# Patient Record
Sex: FEMALE | Race: WHITE | NOT HISPANIC OR LATINO | Employment: UNEMPLOYED | URBAN - METROPOLITAN AREA
[De-identification: names, ages, dates, MRNs, and addresses within clinical notes are randomized per-mention and may not be internally consistent; named-entity substitution may affect disease eponyms.]

---

## 2018-03-19 ENCOUNTER — HOSPITAL ENCOUNTER (EMERGENCY)
Facility: HOSPITAL | Age: 48
Discharge: HOME | End: 2018-03-19
Attending: EMERGENCY MEDICINE

## 2018-03-19 VITALS
BODY MASS INDEX: 34.36 KG/M2 | OXYGEN SATURATION: 100 % | TEMPERATURE: 99 F | HEIGHT: 61 IN | HEART RATE: 76 BPM | RESPIRATION RATE: 16 BRPM | WEIGHT: 182 LBS | SYSTOLIC BLOOD PRESSURE: 133 MMHG | DIASTOLIC BLOOD PRESSURE: 75 MMHG

## 2018-03-19 DIAGNOSIS — K43.9 VENTRAL HERNIA WITHOUT OBSTRUCTION OR GANGRENE: Primary | ICD-10-CM

## 2018-03-19 DIAGNOSIS — M54.12 CERVICAL RADICULOPATHY: ICD-10-CM

## 2018-03-19 PROCEDURE — 99283 EMERGENCY DEPT VISIT LOW MDM: CPT

## 2018-03-19 RX ORDER — PHENTERMINE HYDROCHLORIDE 37.5 MG/1
18.75 CAPSULE ORAL EVERY MORNING
Status: ON HOLD | COMMUNITY
End: 2019-02-07

## 2018-03-19 NOTE — ED PROVIDER NOTES
"Encounter Date: 3/19/2018    SCRIBE #1 NOTE: I, Yukiiman Soliman , am scribing for, and in the presence of, Dr. Hope .       History     Chief Complaint   Patient presents with    Hernia     abdomen bulging out with cough after 40lb wt loss in 3 months.     The history is provided by the patient.            2018 3:23 PM     Chief complaint: Angelica Powers is a 48 y.o. female  who presents to the ED with a hernia via with an onset x 3 days PTA. Patient also complains of numbness and neck pain x 4 months. The patient states that she was bed ridden this weekend due to a stomach virus. She states that during this time she was coughing and noticed a "bulge pop out " of her stomach when she coughed. The patient relays that she only feels a minimal amount of  pain in her abdomen when the hernia pops out. Patient reports that she a PSHx of an appendectomy, , hysterectomy, and a cyst removal. Patient also reports that she has been experiencing numbness in her right arm. She claims that this has been going on for the past four months and has become more frequent recently. The patient states that her arm usually becomes numb when she is sitting down, driving or brushing her hair. She claims that she has had two previous back surgeries. She also admits to neck pain. Patient denies chest pain, back pain, tingling , diaphoresis, palpitations, edema, and n/v/d.     Review of patient's allergies indicates:   Allergen Reactions    Phenergan [promethazine] Hives     Past Medical History:   Diagnosis Date    Bipolar affective disorder     Encounter for blood transfusion     Scoliosis     Scoliosis      Past Surgical History:   Procedure Laterality Date    ABDOMINAL SURGERY      ANKLE FRACTURE SURGERY      APPENDECTOMY      BACK SURGERY      HYSTERECTOMY       History reviewed. No pertinent family history.  Social History   Substance Use Topics    Smoking status: Never Smoker    Smokeless " tobacco: Not on file    Alcohol use Yes      Comment: rarely     Review of Systems   Constitutional: Negative for activity change, appetite change, chills, fatigue and fever.   Eyes: Negative for visual disturbance.   Respiratory: Negative for apnea and shortness of breath.    Cardiovascular: Negative for chest pain and palpitations.   Gastrointestinal: Positive for abdominal pain. Negative for abdominal distention.   Genitourinary: Negative for difficulty urinating.   Musculoskeletal: Positive for neck pain.   Skin: Negative for pallor and rash.   Neurological: Positive for numbness. Negative for headaches.   Hematological: Does not bruise/bleed easily.   Psychiatric/Behavioral: Negative for agitation.       Physical Exam     Initial Vitals [03/19/18 1431]   BP Pulse Resp Temp SpO2   (!) 140/103 82 14 98.6 °F (37 °C) 99 %      MAP       115.33         Physical Exam    Nursing note and vitals reviewed.  Constitutional: She appears well-developed and well-nourished. She is not diaphoretic. No distress.   HENT:   Head: Normocephalic and atraumatic.   Right Ear: External ear normal.   Left Ear: External ear normal.   Nose: Nose normal.   Mouth/Throat: Oropharynx is clear and moist.   Eyes: Conjunctivae and EOM are normal. Pupils are equal, round, and reactive to light.   Neck: Normal range of motion. Neck supple.   Cardiovascular: Normal rate, regular rhythm, normal heart sounds and intact distal pulses. Exam reveals no gallop and no friction rub.    No murmur heard.  Pulmonary/Chest: Breath sounds normal. No respiratory distress. She has no wheezes. She has no rhonchi. She has no rales.   Abdominal:   Reducible ventral hernia discoidal to the umbilicus   Musculoskeletal: Normal range of motion. She exhibits no edema or tenderness.   Lymphadenopathy:     She has no cervical adenopathy.   Neurological: She is alert and oriented to person, place, and time. She has normal strength. No cranial nerve deficit or sensory  deficit.   Skin: Skin is warm and dry. No rash and no abscess noted. No erythema.   Psychiatric: She has a normal mood and affect.         ED Course   Procedures  Labs Reviewed - No data to display          Medical Decision Making:   History:   Old Medical Records: I decided to obtain old medical records.  ED Management:  Alexsander Powers is a 48 y.o. female who presents with  an asymptomatic reducible ventral hernia which requires no immediate intervention.  She also described radicular symptoms in the upper extremity which are currently absent.        Imaging Results    None         APC / Resident Notes:   I, Dr. Niranjan Hope III, personally performed the services described in this documentation. All medical record entries made by the scribe were at my direction and in my presence.  I have reviewed the chart and agree that the record reflects my personal performance and is accurate and complete. Niranjan Hope III, MD.  5:25 PM 03/19/2018       Scribe Attestation:   Scribe #1: I performed the above scribed service and the documentation accurately describes the services I performed. I attest to the accuracy of the note.               Clinical Impression:   The primary encounter diagnosis was Ventral hernia without obstruction or gangrene. A diagnosis of Cervical radiculopathy was also pertinent to this visit.    Disposition:   Disposition: Discharged  Condition: Stable                        Niranjan Hope III, MD  03/19/18 1999

## 2018-05-09 ENCOUNTER — HOSPITAL ENCOUNTER (EMERGENCY)
Facility: HOSPITAL | Age: 48
Discharge: HOME | End: 2018-05-09
Attending: EMERGENCY MEDICINE

## 2018-05-09 VITALS
TEMPERATURE: 98 F | WEIGHT: 168 LBS | SYSTOLIC BLOOD PRESSURE: 161 MMHG | BODY MASS INDEX: 31.72 KG/M2 | DIASTOLIC BLOOD PRESSURE: 72 MMHG | HEIGHT: 61 IN | RESPIRATION RATE: 18 BRPM | OXYGEN SATURATION: 98 % | HEART RATE: 82 BPM

## 2018-05-09 DIAGNOSIS — R10.33 PERIUMBILICAL ABDOMINAL PAIN: Primary | ICD-10-CM

## 2018-05-09 DIAGNOSIS — K43.9 VENTRAL HERNIA WITHOUT OBSTRUCTION OR GANGRENE: ICD-10-CM

## 2018-05-09 LAB
ALBUMIN SERPL BCP-MCNC: 4.6 G/DL
ALP SERPL-CCNC: 82 U/L
ALT SERPL W/O P-5'-P-CCNC: 40 U/L
ANION GAP SERPL CALC-SCNC: 11 MMOL/L
AST SERPL-CCNC: 29 U/L
BASOPHILS NFR BLD: 2 %
BILIRUB SERPL-MCNC: 0.9 MG/DL
BILIRUB UR QL STRIP: ABNORMAL
BUN SERPL-MCNC: 11 MG/DL
CALCIUM SERPL-MCNC: 10.1 MG/DL
CHLORIDE SERPL-SCNC: 106 MMOL/L
CLARITY UR: CLEAR
CO2 SERPL-SCNC: 22 MMOL/L
COLOR UR: YELLOW
CREAT SERPL-MCNC: 0.8 MG/DL
DIFFERENTIAL METHOD: ABNORMAL
EOSINOPHIL NFR BLD: 0 %
ERYTHROCYTE [DISTWIDTH] IN BLOOD BY AUTOMATED COUNT: 16.4 %
EST. GFR  (AFRICAN AMERICAN): >60 ML/MIN/1.73 M^2
EST. GFR  (NON AFRICAN AMERICAN): >60 ML/MIN/1.73 M^2
GLUCOSE SERPL-MCNC: 81 MG/DL
GLUCOSE UR QL STRIP: NEGATIVE
HCT VFR BLD AUTO: 36.8 %
HGB BLD-MCNC: 11.3 G/DL
HGB UR QL STRIP: NEGATIVE
HYPOCHROMIA BLD QL SMEAR: ABNORMAL
KETONES UR QL STRIP: NEGATIVE
LACTATE SERPL-SCNC: 0.8 MMOL/L
LEUKOCYTE ESTERASE UR QL STRIP: NEGATIVE
LIPASE SERPL-CCNC: 13 U/L
LYMPHOCYTES NFR BLD: 22 %
MCH RBC QN AUTO: 18.8 PG
MCHC RBC AUTO-ENTMCNC: 30.7 G/DL
MCV RBC AUTO: 61 FL
MONOCYTES NFR BLD: 3 %
NEUTROPHILS NFR BLD: 73 %
NITRITE UR QL STRIP: NEGATIVE
PH UR STRIP: 6 [PH] (ref 5–8)
PLATELET # BLD AUTO: 331 K/UL
PLATELET BLD QL SMEAR: ABNORMAL
PMV BLD AUTO: 11.9 FL
POTASSIUM SERPL-SCNC: 3.2 MMOL/L
PROT SERPL-MCNC: 8.2 G/DL
PROT UR QL STRIP: NEGATIVE
RBC # BLD AUTO: 6.02 M/UL
SODIUM SERPL-SCNC: 139 MMOL/L
SP GR UR STRIP: >=1.03 (ref 1–1.03)
URN SPEC COLLECT METH UR: ABNORMAL
UROBILINOGEN UR STRIP-ACNC: 1 EU/DL
WBC # BLD AUTO: 12.1 K/UL

## 2018-05-09 PROCEDURE — 36415 COLL VENOUS BLD VENIPUNCTURE: CPT

## 2018-05-09 PROCEDURE — 99284 EMERGENCY DEPT VISIT MOD MDM: CPT | Mod: 25

## 2018-05-09 PROCEDURE — 85007 BL SMEAR W/DIFF WBC COUNT: CPT

## 2018-05-09 PROCEDURE — 85027 COMPLETE CBC AUTOMATED: CPT

## 2018-05-09 PROCEDURE — 25500020 PHARM REV CODE 255

## 2018-05-09 PROCEDURE — 96374 THER/PROPH/DIAG INJ IV PUSH: CPT

## 2018-05-09 PROCEDURE — 25000003 PHARM REV CODE 250: Performed by: EMERGENCY MEDICINE

## 2018-05-09 PROCEDURE — 81003 URINALYSIS AUTO W/O SCOPE: CPT

## 2018-05-09 PROCEDURE — 96361 HYDRATE IV INFUSION ADD-ON: CPT

## 2018-05-09 PROCEDURE — 83605 ASSAY OF LACTIC ACID: CPT

## 2018-05-09 PROCEDURE — 63600175 PHARM REV CODE 636 W HCPCS: Performed by: EMERGENCY MEDICINE

## 2018-05-09 PROCEDURE — 83690 ASSAY OF LIPASE: CPT

## 2018-05-09 PROCEDURE — 80053 COMPREHEN METABOLIC PANEL: CPT

## 2018-05-09 RX ORDER — ONDANSETRON 4 MG/1
4 TABLET, FILM COATED ORAL EVERY 6 HOURS
Qty: 12 TABLET | Refills: 0 | Status: ON HOLD | OUTPATIENT
Start: 2018-05-09 | End: 2019-02-08 | Stop reason: SDUPTHER

## 2018-05-09 RX ORDER — ONDANSETRON 4 MG/1
4 TABLET, ORALLY DISINTEGRATING ORAL
Status: COMPLETED | OUTPATIENT
Start: 2018-05-09 | End: 2018-05-09

## 2018-05-09 RX ORDER — HYDROMORPHONE HYDROCHLORIDE 2 MG/ML
1 INJECTION, SOLUTION INTRAMUSCULAR; INTRAVENOUS; SUBCUTANEOUS
Status: COMPLETED | OUTPATIENT
Start: 2018-05-09 | End: 2018-05-09

## 2018-05-09 RX ORDER — SODIUM CHLORIDE 9 MG/ML
INJECTION, SOLUTION INTRAVENOUS
Status: DISCONTINUED
Start: 2018-05-09 | End: 2018-05-10 | Stop reason: HOSPADM

## 2018-05-09 RX ORDER — CYCLOBENZAPRINE HCL 10 MG
10 TABLET ORAL 3 TIMES DAILY PRN
Qty: 15 TABLET | Refills: 0 | Status: SHIPPED | OUTPATIENT
Start: 2018-05-09 | End: 2018-05-14

## 2018-05-09 RX ORDER — NAPROXEN 500 MG/1
500 TABLET ORAL 2 TIMES DAILY WITH MEALS
Qty: 30 TABLET | Refills: 0 | Status: SHIPPED | OUTPATIENT
Start: 2018-05-09 | End: 2018-05-24

## 2018-05-09 RX ADMIN — IOHEXOL 75 ML: 350 INJECTION, SOLUTION INTRAVENOUS at 09:05

## 2018-05-09 RX ADMIN — HYDROMORPHONE HYDROCHLORIDE 1 MG: 2 INJECTION INTRAMUSCULAR; INTRAVENOUS; SUBCUTANEOUS at 07:05

## 2018-05-09 RX ADMIN — ONDANSETRON 4 MG: 4 TABLET, ORALLY DISINTEGRATING ORAL at 07:05

## 2018-05-09 RX ADMIN — IOHEXOL 15 ML: 350 INJECTION, SOLUTION INTRAVENOUS at 09:05

## 2018-05-09 RX ADMIN — ONDANSETRON 4 MG: 4 TABLET, ORALLY DISINTEGRATING ORAL at 09:05

## 2018-05-09 RX ADMIN — SODIUM CHLORIDE 1000 ML: 0.9 INJECTION, SOLUTION INTRAVENOUS at 07:05

## 2018-05-10 NOTE — ED PROVIDER NOTES
Encounter Date: 5/9/2018    SCRIBE #1 NOTE: I, Zaheer Dillon, am scribing for, and in the presence of, Dr. Hamilton.       History     Chief Complaint   Patient presents with    Abdominal Pain     c/o lower abdominal pain x 3 days with nausea-hx of hernia       05/09/2018 7:06 PM     Chief complaint: Abdominal Pain      Alexsander Powers is a 48 y.o. female with a past medical history of scoliosis, abdominal hernia, and Bipolar affective disorder presenting to the ED with a gradual onset of sudden abdominal pain beginning 3 days ago. The pt reported she has a known abdominal hernia for 2 months with no initial pain. She noted the abdominal pain has been progressively worsened for 3 days when she noticed her abdomen was bulging and pushed her intestines back in. The pt described the pain as a constant dull pain. She stated the abdominal pain is exacerbated with abdominal contraction. The pt endorsed she has not had a bowel movement in 2 days. Associated symptoms of nausea and abdominal distension. The pt denied constipation and vomiting. She denied alleviation of pain with aleve. The pt has no history of cigarette smoking. She has a past surgical history of back surgery and abdominal surgery.       The history is provided by the patient.     Review of patient's allergies indicates:   Allergen Reactions    Phenergan [promethazine] Hives     Past Medical History:   Diagnosis Date    Bipolar affective disorder     Encounter for blood transfusion     Scoliosis     Scoliosis      Past Surgical History:   Procedure Laterality Date    ABDOMINAL SURGERY      ANKLE FRACTURE SURGERY      APPENDECTOMY      BACK SURGERY      HYSTERECTOMY       History reviewed. No pertinent family history.  Social History   Substance Use Topics    Smoking status: Never Smoker    Smokeless tobacco: Not on file    Alcohol use Yes      Comment: rarely     Review of Systems   Constitutional: Negative for fever.   HENT: Negative for  congestion.    Eyes: Negative for visual disturbance.   Respiratory: Negative for wheezing.    Cardiovascular: Negative for chest pain.   Gastrointestinal: Positive for abdominal distention, abdominal pain and nausea. Negative for constipation and vomiting.   Genitourinary: Negative for dysuria.   Musculoskeletal: Negative for joint swelling.   Skin: Negative for rash.   Neurological: Negative for syncope.   Hematological: Does not bruise/bleed easily.   Psychiatric/Behavioral: Negative for confusion.       Physical Exam     Initial Vitals [05/09/18 1855]   BP Pulse Resp Temp SpO2   (!) 175/96 79 18 98.2 °F (36.8 °C) 100 %      MAP       122.33         Physical Exam    Nursing note and vitals reviewed.  Constitutional: She appears well-nourished.   HENT:   Head: Normocephalic and atraumatic.   Eyes: Conjunctivae and EOM are normal.   Neck: Normal range of motion. Neck supple. No thyroid mass present.   Cardiovascular: Normal rate, regular rhythm and normal heart sounds. Exam reveals no gallop and no friction rub.    No murmur heard.  Pulmonary/Chest: Breath sounds normal. She has no wheezes. She has no rhonchi. She has no rales.   Abdominal: Normal appearance and bowel sounds are normal. There is tenderness (infraumbilical).   Un reducible bowel loops    Neurological: She is alert and oriented to person, place, and time. She has normal strength. No cranial nerve deficit or sensory deficit.   Skin: Skin is warm and dry. No rash noted. No erythema.   Psychiatric: She has a normal mood and affect. Her speech is normal. Cognition and memory are normal.         ED Course   Procedures  Labs Reviewed   CBC W/ AUTO DIFFERENTIAL - Abnormal; Notable for the following:        Result Value    RBC 6.02 (*)     Hemoglobin 11.3 (*)     Hematocrit 36.8 (*)     MCV 61 (*)     MCH 18.8 (*)     MCHC 30.7 (*)     RDW 16.4 (*)     Mono% 3.0 (*)     Basophil% 2.0 (*)     All other components within normal limits   COMPREHENSIVE METABOLIC  PANEL - Abnormal; Notable for the following:     Potassium 3.2 (*)     CO2 22 (*)     All other components within normal limits   URINALYSIS - Abnormal; Notable for the following:     Specific Gravity, UA >=1.030 (*)     Bilirubin (UA) 1+ (*)     All other components within normal limits   LIPASE   LACTIC ACID, PLASMA             Medical Decision Making:   History:   Old Medical Records: I decided to obtain old medical records.  Clinical Tests:   Lab Tests: Ordered and Reviewed  Radiological Study: Ordered and Reviewed  ED Management:  This patient was interviewed and examined him urgently.  At this time she has a nonsurgical abdominal examination and there is no findings concerning for incarcerated or strangulated hernia at this time.  She reports having difficulty reducing a reported ventral wall hernia earlier today which may be the cause for ongoing abdominal soreness.  Screening labs were found to be negative for evidence of progressing infection or inflammation or bowel ischemia.  A CT scan of the abdomen and pelvis with oral and IV contrast additionally does not indicate evidence of bowel obstruction, strangulated or necrotic loops.  Patient will be discharged with prescription for oral analgesics and antiemetic medication.  She is educated about how to reduce abdominal wall hernias at home but is informed that she will need to follow up with the surgeon as soon as possible regarding definitive surgical repair.  She is otherwise asked to return to the ER for any new, concerning, or worsening symptoms.  Patient was agreeable with this plan for follow-up and she was discharged in stable condition.    Imaging Results    None                 Scribe Attestation:   Scribe #1: I performed the above scribed service and the documentation accurately describes the services I performed. I attest to the accuracy of the note.    I, Dr. Danny Hamilton, personally performed the services described in this documentation. All  medical record entries made by the scribe were at my direction and in my presence.  I have reviewed the chart and agree that the record reflects my personal performance and is accurate and complete. Danny Hamilton MD.  4:47 AM 05/10/2018             Clinical Impression:   The primary encounter diagnosis was Periumbilical abdominal pain. A diagnosis of Ventral hernia without obstruction or gangrene was also pertinent to this visit.    Disposition:   Disposition: Discharged  Condition: Stable                        Danny Hamilton MD  05/10/18 0447

## 2019-02-07 ENCOUNTER — HOSPITAL ENCOUNTER (OUTPATIENT)
Facility: HOSPITAL | Age: 49
Discharge: HOME | End: 2019-02-08
Attending: INTERNAL MEDICINE | Admitting: INTERNAL MEDICINE

## 2019-02-07 DIAGNOSIS — R07.9 CHEST PAIN OF UNCERTAIN ETIOLOGY: ICD-10-CM

## 2019-02-07 DIAGNOSIS — R01.1 MURMUR: Primary | ICD-10-CM

## 2019-02-07 DIAGNOSIS — R07.9 CHEST PAIN: ICD-10-CM

## 2019-02-07 DIAGNOSIS — Z79.1 NSAID LONG-TERM USE: ICD-10-CM

## 2019-02-07 DIAGNOSIS — D50.9 MICROCYTIC ANEMIA: ICD-10-CM

## 2019-02-07 PROBLEM — E78.1 HYPERTRIGLYCERIDEMIA: Status: ACTIVE | Noted: 2019-02-07

## 2019-02-07 LAB
ALBUMIN SERPL BCP-MCNC: 3.5 G/DL
ALP SERPL-CCNC: 71 U/L
ALT SERPL W/O P-5'-P-CCNC: 20 U/L
AMPHET+METHAMPHET UR QL: NEGATIVE
ANION GAP SERPL CALC-SCNC: 8 MMOL/L
AST SERPL-CCNC: 16 U/L
BARBITURATES UR QL SCN>200 NG/ML: NEGATIVE
BASOPHILS # BLD AUTO: 0 K/UL
BASOPHILS NFR BLD: 0.1 %
BENZODIAZ UR QL SCN>200 NG/ML: NORMAL
BILIRUB SERPL-MCNC: 0.4 MG/DL
BUN SERPL-MCNC: 15 MG/DL
BZE UR QL SCN: NEGATIVE
CALCIUM SERPL-MCNC: 9.2 MG/DL
CANNABINOIDS UR QL SCN: NEGATIVE
CHLORIDE SERPL-SCNC: 106 MMOL/L
CHOLEST SERPL-MCNC: 172 MG/DL
CHOLEST/HDLC SERPL: 3.4 {RATIO}
CO2 SERPL-SCNC: 24 MMOL/L
CREAT SERPL-MCNC: 0.8 MG/DL
CREAT UR-MCNC: 319.5 MG/DL
D DIMER PPP IA.FEU-MCNC: <0.19 MG/L FEU
DIFFERENTIAL METHOD: ABNORMAL
EOSINOPHIL # BLD AUTO: 0.5 K/UL
EOSINOPHIL NFR BLD: 6.1 %
ERYTHROCYTE [DISTWIDTH] IN BLOOD BY AUTOMATED COUNT: 17 %
EST. GFR  (AFRICAN AMERICAN): >60 ML/MIN/1.73 M^2
EST. GFR  (NON AFRICAN AMERICAN): >60 ML/MIN/1.73 M^2
GLUCOSE SERPL-MCNC: 101 MG/DL
HCT VFR BLD AUTO: 36.8 %
HDLC SERPL-MCNC: 50 MG/DL
HDLC SERPL: 29.1 %
HGB BLD-MCNC: 11.4 G/DL
LDLC SERPL CALC-MCNC: 84.2 MG/DL
LYMPHOCYTES # BLD AUTO: 1.6 K/UL
LYMPHOCYTES NFR BLD: 20.2 %
MCH RBC QN AUTO: 18.9 PG
MCHC RBC AUTO-ENTMCNC: 31 G/DL
MCV RBC AUTO: 61 FL
METHADONE UR QL SCN>300 NG/ML: NEGATIVE
MONOCYTES # BLD AUTO: 0.7 K/UL
MONOCYTES NFR BLD: 8.3 %
NEUTROPHILS # BLD AUTO: 5.1 K/UL
NEUTROPHILS NFR BLD: 65.3 %
NONHDLC SERPL-MCNC: 122 MG/DL
OPIATES UR QL SCN: NEGATIVE
PCP UR QL SCN>25 NG/ML: NEGATIVE
PLATELET # BLD AUTO: 254 K/UL
PMV BLD AUTO: 9.6 FL
POTASSIUM SERPL-SCNC: 4.1 MMOL/L
PROT SERPL-MCNC: 6.7 G/DL
RBC # BLD AUTO: 6.03 M/UL
SODIUM SERPL-SCNC: 138 MMOL/L
TOXICOLOGY INFORMATION: NORMAL
TRIGL SERPL-MCNC: 189 MG/DL
TROPONIN I SERPL DL<=0.01 NG/ML-MCNC: <0.006 NG/ML
WBC # BLD AUTO: 7.8 K/UL

## 2019-02-07 PROCEDURE — G8980 MOBILITY D/C STATUS: HCPCS | Mod: CH

## 2019-02-07 PROCEDURE — 84484 ASSAY OF TROPONIN QUANT: CPT

## 2019-02-07 PROCEDURE — 97161 PT EVAL LOW COMPLEX 20 MIN: CPT

## 2019-02-07 PROCEDURE — 85025 COMPLETE CBC W/AUTO DIFF WBC: CPT

## 2019-02-07 PROCEDURE — 25000003 PHARM REV CODE 250: Performed by: NURSE PRACTITIONER

## 2019-02-07 PROCEDURE — 80061 LIPID PANEL: CPT

## 2019-02-07 PROCEDURE — G8979 MOBILITY GOAL STATUS: HCPCS | Mod: CH

## 2019-02-07 PROCEDURE — 80307 DRUG TEST PRSMV CHEM ANLYZR: CPT

## 2019-02-07 PROCEDURE — 94761 N-INVAS EAR/PLS OXIMETRY MLT: CPT

## 2019-02-07 PROCEDURE — 80053 COMPREHEN METABOLIC PANEL: CPT

## 2019-02-07 PROCEDURE — 93005 ELECTROCARDIOGRAM TRACING: CPT

## 2019-02-07 PROCEDURE — 36415 COLL VENOUS BLD VENIPUNCTURE: CPT

## 2019-02-07 PROCEDURE — 85379 FIBRIN DEGRADATION QUANT: CPT

## 2019-02-07 PROCEDURE — G0378 HOSPITAL OBSERVATION PER HR: HCPCS

## 2019-02-07 PROCEDURE — 63600175 PHARM REV CODE 636 W HCPCS: Performed by: NURSE PRACTITIONER

## 2019-02-07 PROCEDURE — G0379 DIRECT REFER HOSPITAL OBSERV: HCPCS

## 2019-02-07 PROCEDURE — G8978 MOBILITY CURRENT STATUS: HCPCS | Mod: CH

## 2019-02-07 RX ORDER — OMEPRAZOLE 20 MG/1
20 CAPSULE, DELAYED RELEASE ORAL DAILY
Qty: 30 CAPSULE | Refills: 0 | Status: SHIPPED | OUTPATIENT
Start: 2019-02-07 | End: 2019-11-06

## 2019-02-07 RX ORDER — POTASSIUM CHLORIDE 20 MEQ/15ML
60 SOLUTION ORAL
Status: DISCONTINUED | OUTPATIENT
Start: 2019-02-07 | End: 2019-02-08 | Stop reason: HOSPADM

## 2019-02-07 RX ORDER — ASPIRIN 325 MG
325 TABLET ORAL DAILY
Status: DISCONTINUED | OUTPATIENT
Start: 2019-02-07 | End: 2019-02-08 | Stop reason: HOSPADM

## 2019-02-07 RX ORDER — LANOLIN ALCOHOL/MO/W.PET/CERES
800 CREAM (GRAM) TOPICAL
Status: DISCONTINUED | OUTPATIENT
Start: 2019-02-07 | End: 2019-02-08 | Stop reason: HOSPADM

## 2019-02-07 RX ORDER — HYDROXYZINE PAMOATE 25 MG/1
50 CAPSULE ORAL EVERY 8 HOURS PRN
Status: DISCONTINUED | OUTPATIENT
Start: 2019-02-07 | End: 2019-02-08 | Stop reason: HOSPADM

## 2019-02-07 RX ORDER — PANTOPRAZOLE SODIUM 40 MG/1
40 TABLET, DELAYED RELEASE ORAL DAILY
Status: DISCONTINUED | OUTPATIENT
Start: 2019-02-07 | End: 2019-02-08 | Stop reason: HOSPADM

## 2019-02-07 RX ORDER — POTASSIUM CHLORIDE 20 MEQ/15ML
40 SOLUTION ORAL
Status: DISCONTINUED | OUTPATIENT
Start: 2019-02-07 | End: 2019-02-08 | Stop reason: HOSPADM

## 2019-02-07 RX ORDER — IBUPROFEN 200 MG
16 TABLET ORAL
Status: DISCONTINUED | OUTPATIENT
Start: 2019-02-07 | End: 2019-02-08 | Stop reason: HOSPADM

## 2019-02-07 RX ORDER — AMOXICILLIN 250 MG
1 CAPSULE ORAL 2 TIMES DAILY
Status: DISCONTINUED | OUTPATIENT
Start: 2019-02-07 | End: 2019-02-08 | Stop reason: HOSPADM

## 2019-02-07 RX ORDER — GLUCOSAM/CHONDRO/HERB 149/HYAL 750-100 MG
1 TABLET ORAL 2 TIMES DAILY WITH MEALS
Status: DISCONTINUED | OUTPATIENT
Start: 2019-02-07 | End: 2019-02-08 | Stop reason: HOSPADM

## 2019-02-07 RX ORDER — SODIUM CHLORIDE 0.9 % (FLUSH) 0.9 %
5 SYRINGE (ML) INJECTION
Status: DISCONTINUED | OUTPATIENT
Start: 2019-02-07 | End: 2019-02-08 | Stop reason: HOSPADM

## 2019-02-07 RX ORDER — ACETAMINOPHEN 500 MG
1000 TABLET ORAL EVERY 6 HOURS PRN
Status: DISCONTINUED | OUTPATIENT
Start: 2019-02-07 | End: 2019-02-08 | Stop reason: HOSPADM

## 2019-02-07 RX ORDER — IBUPROFEN 200 MG
24 TABLET ORAL
Status: DISCONTINUED | OUTPATIENT
Start: 2019-02-07 | End: 2019-02-08 | Stop reason: HOSPADM

## 2019-02-07 RX ORDER — RAMELTEON 8 MG/1
8 TABLET ORAL NIGHTLY PRN
Status: DISCONTINUED | OUTPATIENT
Start: 2019-02-07 | End: 2019-02-08 | Stop reason: HOSPADM

## 2019-02-07 RX ORDER — KETOROLAC TROMETHAMINE 30 MG/ML
15 INJECTION, SOLUTION INTRAMUSCULAR; INTRAVENOUS EVERY 6 HOURS PRN
Status: DISCONTINUED | OUTPATIENT
Start: 2019-02-07 | End: 2019-02-08 | Stop reason: HOSPADM

## 2019-02-07 RX ORDER — GLUCAGON 1 MG
1 KIT INJECTION
Status: DISCONTINUED | OUTPATIENT
Start: 2019-02-07 | End: 2019-02-08 | Stop reason: HOSPADM

## 2019-02-07 RX ORDER — ONDANSETRON 2 MG/ML
8 INJECTION INTRAMUSCULAR; INTRAVENOUS EVERY 8 HOURS PRN
Status: DISCONTINUED | OUTPATIENT
Start: 2019-02-07 | End: 2019-02-08 | Stop reason: HOSPADM

## 2019-02-07 RX ADMIN — LIDOCAINE HYDROCHLORIDE: 20 SOLUTION ORAL; TOPICAL at 01:02

## 2019-02-07 RX ADMIN — KETOROLAC TROMETHAMINE 15 MG: 30 INJECTION, SOLUTION INTRAMUSCULAR at 01:02

## 2019-02-07 RX ADMIN — HYDROXYZINE PAMOATE 50 MG: 25 CAPSULE ORAL at 10:02

## 2019-02-07 RX ADMIN — KETOROLAC TROMETHAMINE 15 MG: 30 INJECTION, SOLUTION INTRAMUSCULAR at 08:02

## 2019-02-07 RX ADMIN — PANTOPRAZOLE SODIUM 40 MG: 40 TABLET, DELAYED RELEASE ORAL at 12:02

## 2019-02-07 RX ADMIN — THERA TABS 1 TABLET: TAB at 12:02

## 2019-02-07 RX ADMIN — OMEGA-3 FATTY ACIDS CAP 1000 MG 1 CAPSULE: 1000 CAP at 05:02

## 2019-02-07 RX ADMIN — KETOROLAC TROMETHAMINE 15 MG: 30 INJECTION, SOLUTION INTRAMUSCULAR at 02:02

## 2019-02-07 RX ADMIN — ASPIRIN 325 MG ORAL TABLET 325 MG: 325 PILL ORAL at 11:02

## 2019-02-07 RX ADMIN — ACETAMINOPHEN 1000 MG: 500 TABLET ORAL at 12:02

## 2019-02-07 NOTE — PROGRESS NOTES
Patient seen and examined. ASA, fish oil, MVI, and PPI added.  Stable. DDimer negative. PA and lateral Cxr pending. Murmur noted and echocardiogram ordered.  No CP at this time. Patient scheduled for a stress test in am.

## 2019-02-07 NOTE — ASSESSMENT & PLAN NOTE
Chronic condition, not medicated for the last 10 years per Pt.  Recommended she follow up with psychiatrist unit just for counseling session.  No evidence of acute psychosis or depression.

## 2019-02-07 NOTE — PLAN OF CARE
Problem: Adult Inpatient Plan of Care  Goal: Plan of Care Review  Outcome: Ongoing (interventions implemented as appropriate)   02/07/19 5200   Plan of Care Review   Plan of Care Reviewed With patient   Pt alert and oriented. No distress noted. VSS. GI cocktail given pt tolerated well. Denies n/v, sob. Ambulated to BR. Free of fall or injury. Side rails up x2. Call light in reach. Will continue to monitor.

## 2019-02-07 NOTE — PLAN OF CARE
Patient denies having a PCP or health insurance at this time.  Informed patient that she will be seen by the Medicaid Counselor; verbalized understanding.  Denies HH/DME.  Pharmacy is [facility].  Discharge plan is home no needs.       02/07/19 1152   Discharge Assessment   Assessment Type Discharge Planning Assessment   Confirmed/corrected address and phone number on facesheet? Yes   Assessment information obtained from? Medical Record;Patient   Prior to hospitilization cognitive status: Alert/Oriented   Prior to hospitalization functional status: Independent   Current cognitive status: Alert/Oriented   Current Functional Status: Independent   Lives With spouse   Able to Return to Prior Arrangements yes   Is patient able to care for self after discharge? Yes   Patient's perception of discharge disposition home or selfcare   Readmission Within the Last 30 Days no previous admission in last 30 days   Patient currently being followed by outpatient case management? No   Patient currently receives any other outside agency services? No   Equipment Currently Used at Home none   Do you have any problems affording any of your prescribed medications? No   Is the patient taking medications as prescribed? yes   Does the patient have transportation home? Yes   Transportation Anticipated family or friend will provide   Dialysis Name and Scheduled days none   Does the patient receive services at the [facility]? No   Discharge Plan A Home   DME Needed Upon Discharge  none   Patient/Family in Agreement with Plan yes

## 2019-02-07 NOTE — PT/OT/SLP EVAL
Physical Therapy Evaluation and Discharge Note    Patient Name:  Alexsander Powers   MRN:  [id number]    Recommendations:     Discharge Recommendations:  (home)   Discharge Equipment Recommendations: none   Barriers to discharge: None    Assessment:     Alexsander Powers is a 49 y.o. female admitted with a medical diagnosis of Chest pain. .  At this time, patient is functioning at their prior level of function and does not require further acute PT services.  Pt functional with bed mobility, transfers and gait although slow due to CP.  Pt for possible stress test today    Recent Surgery: * No surgery found *      Plan:     During this hospitalization, patient does not require further acute PT services.  Please re-consult if situation changes.      Subjective     Chief Complaint: CP better than what it was yesterday- stated of being hungry  Patient/Family Comments/goals: get well  Pain/Comfort:  · Pain Rating 1: 7/10  · Location 1: chest  · Pain Addressed 1: Pre-medicate for activity, Nurse notified    Patients cultural, spiritual, [employer]ism conflicts given the current situation:      Living Environment:  Home with family  Prior to admission, patients level of function was independent.  Equipment used at home: none.  DME owned (not currently used): none.  Upon discharge, patient will have assistance from family.    Objective:     Communicated with nurse Resendiz prior to session.  Patient found supine upon PT entry to room found with: telemetry     General Precautions: Standard,     Orthopedic Precautions:N/A   Braces: N/A     Exams:  · RLE ROM: WFL  · RLE Strength: WFL  · LLE ROM: WFL  · LLE Strength: WFL    Functional Mobility:  · Bed Mobility:     · Rolling Left:  independence  · Scooting: independence  · Supine to Sit: independence  · Sit to Supine: independence  · Transfers:     · Sit to Stand:  independence with no AD  · Gait: 250ft with HHA slow jerel but no loss of balance, SAT on RA 98-99%     AM-PAC 6 CLICK  MOBILITY  Total Score:24       Therapeutic Activities and Exercises:   back to bed post PT  Encouraged frequent activities to tolerance    AM-PAC 6 CLICK MOBILITY  Total Score:24     Patient left HOB elevated with all lines intact, call button in reach and nurse Astrid notified.    GOALS:   Multidisciplinary Problems     Physical Therapy Goals     Not on file                History:     Past Medical History:   Diagnosis Date    Bipolar affective disorder     Encounter for blood transfusion     Scoliosis     Scoliosis        Past Surgical History:   Procedure Laterality Date    ABDOMINAL SURGERY      ANKLE FRACTURE SURGERY      APPENDECTOMY      BACK SURGERY      HYSTERECTOMY         Time Tracking:     PT Received On: 02/07/19  PT Start Time: 1051     PT Stop Time: 1104  PT Total Time (min): 13 min     Billable Minutes: Evaluation 13      Tiara Wilcox, PT  02/07/2019

## 2019-02-07 NOTE — H&P
[facility] Hospital Medicine  History & Physical    Patient Name: Alexsander Powers  MRN: [id number]  Admission Date: 2/7/2019  Attending Physician: Dione Islas MD   Primary Care Provider: Primary Doctor No         Patient information was obtained from patient, past medical records and ER records.     Subjective:     Principal Problem:Chest pain    Chief Complaint:   Chief Complaint   Patient presents with    Chest Pain        HPI: Alexsander Powers is a 49-year-old female with PMHx significant for bipolar disorder and abnormal stress test.  She presented to ED at [facility] with complaint of chest pain with onset 2 weeks ago.  She reports intermittent chest pain with activity for the last 2 weeks that has increased in severity and now occurs while at rest as well.  Associated symptoms include SOB and nausea without vomiting.  She states symptoms progressed and last night pain was 10/10 prompting her visit to the ED today.  She does report some recent heartburn.  She states the pain feels like a cylinder is sitting on her chest.  She was given nitropaste an outside hospital and reports some improvement in symptoms.  Current pain is about 4/10.  She also reports HA since nitro paste placed.  She was worked up in the past for similar symptoms after taking a diet pill her friend gave her.  She does report she recently used Adipex with last dose over a month ago.  She denies any drug use, cough, fever, chills, or change in chronic hernia related abdominal pain. She did have an abnormal stress test back in 2016, however was evaluated by Cardiology and her condition was felt not to be cardiac related.  She does report Hx of MI in her family with her father at age 40.  She was transferred to [facility] for cardiology evaluation.  Other pertinent medical Hx as below:    Past Medical History:   Diagnosis Date    Bipolar affective disorder     Encounter for blood transfusion     Scoliosis      Scoliosis        Past Surgical History:   Procedure Laterality Date    ABDOMINAL SURGERY      ANKLE FRACTURE SURGERY      APPENDECTOMY      BACK SURGERY      HYSTERECTOMY         Review of patient's allergies indicates:   Allergen Reactions    Phenergan [promethazine] Hives       No current facility-administered medications on file prior to encounter.      Current Outpatient Medications on File Prior to Encounter   Medication Sig    ondansetron (ZOFRAN) 4 MG tablet Take 1 tablet (4 mg total) by mouth every 6 (six) hours.    phentermine 37.5 MG capsule Take 18.75 mg by mouth every morning. Monday - Friday     Family History     Problem Relation (Age of Onset)    Cancer Mother    Diabetes Father    Heart disease Father    Hypertension Father        Tobacco Use    Smoking status: Never Smoker   Substance and Sexual Activity    Alcohol use: Yes     Comment: rarely    Drug use: No    Sexual activity: Yes     Birth control/protection: Surgical     Review of Systems   Constitutional: Negative for activity change, appetite change, fatigue and fever.   HENT: Negative for congestion, postnasal drip, sore throat and trouble swallowing.    Eyes: Negative for photophobia and visual disturbance.   Respiratory: Positive for shortness of breath. Negative for cough, chest tightness and wheezing.    Cardiovascular: Positive for chest pain. Negative for palpitations and leg swelling.   Gastrointestinal: Negative for abdominal distention, constipation, diarrhea, nausea and vomiting.   Genitourinary: Negative for difficulty urinating, dysuria, flank pain, hematuria and urgency.   Musculoskeletal: Negative for arthralgias and myalgias.   Skin: Negative for color change.   Neurological: Positive for headaches (since NTG at OSH.). Negative for dizziness, weakness and numbness.   Psychiatric/Behavioral: Negative for confusion. The patient is not nervous/anxious.      Objective:     Vital Signs (Most Recent):   Temp: 96.4  HR:  78  Resp: 18  BP: 145/84  MAP: 109  SPO2: 96% Vital Signs (24h Range):  Temp:  [97.6 °F (36.4 °C)-98.2 °F (36.8 °C)] 98.2 °F (36.8 °C)  Pulse:  [75-79] 79  Resp:  [16-18] 17  SpO2:  [95 %-100 %] 100 %  BP: (147-158)/(82-91) 151/82        There is no height or weight on file to calculate BMI.    Physical Exam   Constitutional: She is oriented to person, place, and time. She appears well-developed and well-nourished. No distress.   HENT:   Head: Normocephalic and atraumatic.   Eyes: Conjunctivae and EOM are normal. Pupils are equal, round, and reactive to light.   Neck: Normal range of motion. Neck supple.   Cardiovascular: Normal rate, regular rhythm, normal heart sounds and intact distal pulses.   No murmur heard.  Pulmonary/Chest: Effort normal and breath sounds normal. No respiratory distress. She exhibits no tenderness.   Abdominal: Soft. Bowel sounds are normal. She exhibits no distension.   Musculoskeletal: Normal range of motion. She exhibits no edema.   Neurological: She is alert and oriented to person, place, and time. No cranial nerve deficit.   Skin: Skin is warm and dry. Capillary refill takes less than 2 seconds. No erythema.   Psychiatric: She has a normal mood and affect. Her behavior is normal. Judgment and thought content normal.         CRANIAL NERVES     CN III, IV, VI   Pupils are equal, round, and reactive to light.  Extraocular motions are normal.        Labs, imaging, and EKG at OSH reviewed and are as followed.    WBC: 9.3  Hemoglobin:  12.3  Hematocrit:  39.3  Platelets:  270    Sodium:  138  Potassium:  3.9  Glucose:  131  BUN:  10  Creatinine:  0.69    Troponin 1: less than 0.010  Troponin 2:  less than 0.010    EKG:  I reviewed.  Normal sinus rhythm rate 78.  No ischemic ST changes noted.    CTA chest (outside facility):   No CT evidence pulmonary emboli.  No acute pulmonary process.      Assessment/Plan:     * Chest pain    Ongoing atypical chest pain for the last 2 weeks.  CTA chest done  with no evidence of PE.  Troponins negative x2.  Historical abnormal stress test noted-will consult with Cardiology here for further evaluation recommendations.  Keep NPO in case ischemic evaluation recommended.  Trend troponin, check lipid panel in a.m., and monitor on telemetry.  Repeat EKG in a.m. and as needed for chest pain.  Will attempt a GI cocktail, as well.       Anemia    Chronic, H&H actually better than prior.  Trend CBC and transfuse for H& H less than 7 and 21 or for symptomatic anemia.       Bipolar affective disorder    Chronic condition, not medicated for the last 10 years per Pt.  Recommended she follow up with psychiatrist unit just for counseling session.  No evidence of acute psychosis or depression.         VTE Risk Mitigation (From admission, onward)        Ordered     IP VTE LOW RISK PATIENT  Once      02/07/19 0021     Place sequential compression device  Until discontinued      02/07/19 0021             Sharri Sevilla NP  Department of Hospital Medicine   [facility]

## 2019-02-07 NOTE — PLAN OF CARE
Problem: Adult Inpatient Plan of Care  Goal: Plan of Care Review  PT eval completed. Pt independent with mobility, gait to hallways 250ft . CP 7/10- awaiting stress test/NPO- no acute PT needs

## 2019-02-07 NOTE — HOSPITAL COURSE
The patient was monitored closely during hospitalization and kept on continuous telemetry monitoring. Cardiology was consulted. The patient  remained in SR without any signs or symptoms of arrhythmias. The patient's troponin remained negative. She was placed on  ASA, fish oil, MVI, and PPI. Her DDimer was negative. A PA and lateral Cxr was ordered and showed the lungs are clear, with normal appearance of pulmonary vasculature and no pleural effusion or pneumothorax. The cardiac silhouette is normal in size and the hilar and mediastinal contours are unremarkable. She was noted to have a murmur and an echocardiogram was ordered. A Lexiscan stress test was done and was scintigraphically negative for ischemia or infarct. A previously described is suspected small anterior wall infarction on prior stress test was  not reproduced on this exam. The global left ventricular systolic function is preserved with an LV ejection fraction of 63 % and no evidence of LV dilatation. The patient was started on lisinopril for her hypertension. Patient was to take her blood pressure and pulse 2 x day and keep a log for follow up with PCP. She was started on MVI for microcytic anemia. She was also noted to have a long history of NSAID usage (BC powders) and was placed on PPI and instructed to follow up with GI as outpatient. The patient's overall condition remained stable and improved and she was discharged to home once cleared by Cardiology.

## 2019-02-07 NOTE — ASSESSMENT & PLAN NOTE
Chronic, H&H actually better than prior.  Trend CBC and transfuse for H& H less than 7 and 21 or for symptomatic anemia.

## 2019-02-07 NOTE — PT/OT/SLP DISCHARGE
Occupational Therapy Discharge Summary    Alexsander Powers  MRN: [id number]   Principal Problem: Chest pain      Patient Discharged from acute Occupational Therapy on 2/7/2019.        Objective:     GOALS:   Multidisciplinary Problems     Occupational Therapy Goals     Not on file                Reasons for Discontinuation of Therapy Services  Per review of medical chart, therapist has determined that the patient will not require skilled OT services. Please re-order therapy if OT needs arise.     Plan:     Patient Discharged to: Home no OT services needed    Praful Perez, OT  2/7/2019

## 2019-02-07 NOTE — ASSESSMENT & PLAN NOTE
Ongoing atypical chest pain for the last 2 weeks.  CTA chest done with no evidence of PE.  Troponins negative x2.  Historical abnormal stress test noted-will consult with Cardiology here for further evaluation recommendations.  Keep NPO in case ischemic evaluation recommended.  Trend troponin, check lipid panel in a.m., and monitor on telemetry.  Repeat EKG in a.m. and as needed for chest pain.  Will attempt a GI cocktail, as well.

## 2019-02-07 NOTE — PLAN OF CARE
Outside Transfer Acceptance Note    Transferring Physician or Mid Level Provider/Speciality: Dr. Zabala / ED     Accepting Physician: Erlinda Ramos     Destination Hospital: [facility]     Date of Acceptance: 02/06/2019     Code Status: Full    Transferring Facility/Hospital: [facility] ED     Reason for Transfer to [facility]: Chest pain     Report from Transferring Physician or Mid-Level provider/Hospital course:     Patient is a 49 year old female with PMH of CAD, bipolar disorder presented to ED with chest pain. EKG showed NSR w/o acute ischemia, Troponin negative x 2. CTA chest negative for PE or dissection. She remained hemodynamically stable with BP ~150/90. Patient reports she is having worst chest pain and it it not reproducible on exam. She received 2 doses of IV fentanyl in ED and still reports having pain.     Of note, patient was admitted to [facility] in 2016 with  atypical chest pain induced by diet pill. She had negative echo and NM stress test showed probable small myocardial infarction scar in the anterior wall of the left ventricle without reversible ischemia. She was seen by cardiology during that admission.     I have asked Dr. Zabala to give NTG and aspirin.     I have discussed the case with CARLEEN Harrell from [facility].         To do list upon patient arrival:   -observe under telemetry   -trend troponin   -urine drug screen  -consult cardiology       Erlinda Ramos DO   - PFC   Attending Staff Physician   [facility] Medicine

## 2019-02-07 NOTE — HPI
This is a 49-year-old female with PMHx significant for bipolar disorder and prior abnormal stress test.  She presented to ED at [facility] with complaint of chest pain with onset 2 weeks ago.  She reports intermittent chest pain with activity for the last 2 weeks that has increased in severity and now occurs while at rest as well.  Associated symptoms include SOB and nausea without vomiting.  She states symptoms progressed and last night pain was 10/10 prompting her visit to the ED today.  She does report some recent heartburn.  She states the pain feels like a cylinder is sitting on her chest.  She was given nitropaste an outside hospital and reports some improvement in symptoms.  Current pain is about 4/10.  She also reports HA since nitro paste placed.  She was worked up in the past for similar symptoms after taking a diet pill her friend gave her.  She does report she recently used Adipex with last dose over a month ago.  She denies any drug use, cough, fever, chills, or change in chronic hernia related abdominal pain. She did have an abnormal stress test back in 2016, however was evaluated by Cardiology and her condition was felt not to be cardiac related.  She does report Hx of MI in her family with her father at age 40.  She was transferred to [facility] for cardiology evaluation.

## 2019-02-07 NOTE — SUBJECTIVE & OBJECTIVE
Past Medical History:   Diagnosis Date    Bipolar affective disorder     Encounter for blood transfusion     Scoliosis     Scoliosis        Past Surgical History:   Procedure Laterality Date    ABDOMINAL SURGERY      ANKLE FRACTURE SURGERY      APPENDECTOMY      BACK SURGERY      HYSTERECTOMY         Review of patient's allergies indicates:   Allergen Reactions    Phenergan [promethazine] Hives       No current facility-administered medications on file prior to encounter.      Current Outpatient Medications on File Prior to Encounter   Medication Sig    ondansetron (ZOFRAN) 4 MG tablet Take 1 tablet (4 mg total) by mouth every 6 (six) hours.    phentermine 37.5 MG capsule Take 18.75 mg by mouth every morning. Monday - Friday     Family History     Problem Relation (Age of Onset)    Cancer Mother    Diabetes Father    Heart disease Father    Hypertension Father        Tobacco Use    Smoking status: Never Smoker   Substance and Sexual Activity    Alcohol use: Yes     Comment: rarely    Drug use: No    Sexual activity: Yes     Birth control/protection: Surgical     Review of Systems   Constitutional: Negative for activity change, appetite change, fatigue and fever.   HENT: Negative for congestion, postnasal drip, sore throat and trouble swallowing.    Eyes: Negative for photophobia and visual disturbance.   Respiratory: Positive for shortness of breath. Negative for cough, chest tightness and wheezing.    Cardiovascular: Positive for chest pain. Negative for palpitations and leg swelling.   Gastrointestinal: Negative for abdominal distention, constipation, diarrhea, nausea and vomiting.   Genitourinary: Negative for difficulty urinating, dysuria, flank pain, hematuria and urgency.   Musculoskeletal: Negative for arthralgias and myalgias.   Skin: Negative for color change.   Neurological: Positive for headaches (since NTG at OSH.). Negative for dizziness, weakness and numbness.   Psychiatric/Behavioral:  Negative for confusion. The patient is not nervous/anxious.      Objective:     Vital Signs (Most Recent):   Temp: 96.4  HR: 78  Resp: 18  BP: 145/84  MAP: 109  SPO2: 96% Vital Signs (24h Range):  Temp:  [97.6 °F (36.4 °C)-98.2 °F (36.8 °C)] 98.2 °F (36.8 °C)  Pulse:  [75-79] 79  Resp:  [16-18] 17  SpO2:  [95 %-100 %] 100 %  BP: (147-158)/(82-91) 151/82        There is no height or weight on file to calculate BMI.    Physical Exam   Constitutional: She is oriented to person, place, and time. She appears well-developed and well-nourished. No distress.   HENT:   Head: Normocephalic and atraumatic.   Eyes: Conjunctivae and EOM are normal. Pupils are equal, round, and reactive to light.   Neck: Normal range of motion. Neck supple.   Cardiovascular: Normal rate, regular rhythm, normal heart sounds and intact distal pulses.   No murmur heard.  Pulmonary/Chest: Effort normal and breath sounds normal. No respiratory distress. She exhibits no tenderness.   Abdominal: Soft. Bowel sounds are normal. She exhibits no distension.   Musculoskeletal: Normal range of motion. She exhibits no edema.   Neurological: She is alert and oriented to person, place, and time. No cranial nerve deficit.   Skin: Skin is warm and dry. Capillary refill takes less than 2 seconds. No erythema.   Psychiatric: She has a normal mood and affect. Her behavior is normal. Judgment and thought content normal.         CRANIAL NERVES     CN III, IV, VI   Pupils are equal, round, and reactive to light.  Extraocular motions are normal.        Labs, imaging, and EKG at OSH reviewed and are as followed.    WBC: 9.3  Hemoglobin:  12.3  Hematocrit:  39.3  Platelets:  270    Sodium:  138  Potassium:  3.9  Glucose:  131  BUN:  10  Creatinine:  0.69    Troponin 1: less than 0.010  Troponin 2:  less than 0.010    EKG:  I reviewed.  Normal sinus rhythm rate 78.  No ischemic ST changes noted.    CTA chest (outside facility):   No CT evidence pulmonary emboli.  No acute  pulmonary process.

## 2019-02-08 VITALS
TEMPERATURE: 99 F | SYSTOLIC BLOOD PRESSURE: 185 MMHG | DIASTOLIC BLOOD PRESSURE: 82 MMHG | HEIGHT: 65 IN | BODY MASS INDEX: 29.99 KG/M2 | WEIGHT: 180 LBS | HEART RATE: 75 BPM | OXYGEN SATURATION: 96 % | RESPIRATION RATE: 16 BRPM

## 2019-02-08 PROBLEM — I10 BENIGN ESSENTIAL HTN: Status: ACTIVE | Noted: 2019-02-08

## 2019-02-08 LAB
ALBUMIN SERPL BCP-MCNC: 3.2 G/DL
ALP SERPL-CCNC: 63 U/L
ALT SERPL W/O P-5'-P-CCNC: 18 U/L
ANION GAP SERPL CALC-SCNC: 7 MMOL/L
ANISOCYTOSIS BLD QL SMEAR: SLIGHT
AORTIC ROOT ANNULUS: 2.62 CM
AORTIC VALVE CUSP SEPERATION: 1.95 CM
AST SERPL-CCNC: 14 U/L
AV INDEX (PROSTH): 1
AV MEAN GRADIENT: 5.51 MMHG
AV PEAK GRADIENT: 11.02 MMHG
AV VALVE AREA: 3.17 CM2
AV VELOCITY RATIO: 0.84
BASOPHILS NFR BLD: 0 %
BILIRUB SERPL-MCNC: 0.3 MG/DL
BSA FOR ECHO PROCEDURE: 1.93 M2
BUN SERPL-MCNC: 16 MG/DL
CALCIUM SERPL-MCNC: 8.6 MG/DL
CHLORIDE SERPL-SCNC: 107 MMOL/L
CO2 SERPL-SCNC: 24 MMOL/L
CREAT SERPL-MCNC: 0.7 MG/DL
CV ECHO LV RWT: 0.38 CM
CV STRESS BASE HR: 82 BPM
DACRYOCYTES BLD QL SMEAR: ABNORMAL
DIASTOLIC BLOOD PRESSURE: 86 MMHG
DIFFERENTIAL METHOD: ABNORMAL
DOP CALC AO PEAK VEL: 1.66 M/S
DOP CALC AO VTI: 33.49 CM
DOP CALC LVOT AREA: 3.17 CM2
DOP CALC LVOT DIAMETER: 2.01 CM
DOP CALC LVOT PEAK VEL: 1.39 M/S
DOP CALC LVOT STROKE VOLUME: 106.21 CM3
DOP CALCLVOT PEAK VEL VTI: 33.49 CM
E WAVE DECELERATION TIME: 227.64 MSEC
E/A RATIO: 0.88
E/E' RATIO: 9.79
ECHO LV POSTERIOR WALL: 0.82 CM (ref 0.6–1.1)
EOSINOPHIL NFR BLD: 3 %
ERYTHROCYTE [DISTWIDTH] IN BLOOD BY AUTOMATED COUNT: 16.4 %
EST. GFR  (AFRICAN AMERICAN): >60 ML/MIN/1.73 M^2
EST. GFR  (NON AFRICAN AMERICAN): >60 ML/MIN/1.73 M^2
FRACTIONAL SHORTENING: 22 % (ref 28–44)
GLUCOSE SERPL-MCNC: 97 MG/DL
HCT VFR BLD AUTO: 35.1 %
HGB BLD-MCNC: 10.8 G/DL
HYPOCHROMIA BLD QL SMEAR: ABNORMAL
INTERVENTRICULAR SEPTUM: 0.73 CM (ref 0.6–1.1)
IVRT: 0.12 MSEC
LEFT ATRIUM SIZE: 4.09 CM
LEFT INTERNAL DIMENSION IN SYSTOLE: 3.33 CM (ref 2.1–4)
LEFT VENTRICLE DIASTOLIC VOLUME INDEX: 43.47 ML/M2
LEFT VENTRICLE DIASTOLIC VOLUME: 82.22 ML
LEFT VENTRICLE MASS INDEX: 53 G/M2
LEFT VENTRICLE SYSTOLIC VOLUME INDEX: 23.8 ML/M2
LEFT VENTRICLE SYSTOLIC VOLUME: 45.05 ML
LEFT VENTRICULAR INTERNAL DIMENSION IN DIASTOLE: 4.28 CM (ref 3.5–6)
LEFT VENTRICULAR MASS: 100.23 G
LV LATERAL E/E' RATIO: 7.75
LV SEPTAL E/E' RATIO: 13.29
LYMPHOCYTES NFR BLD: 25 %
MCH RBC QN AUTO: 18.8 PG
MCHC RBC AUTO-ENTMCNC: 30.7 G/DL
MCV RBC AUTO: 61 FL
MONOCYTES NFR BLD: 9 %
MV PEAK A VEL: 1.06 M/S
MV PEAK E VEL: 0.93 M/S
NEUTROPHILS NFR BLD: 63 %
OHS CV CPX 85 PERCENT MAX PREDICTED HEART RATE MALE: 140
OHS CV CPX ESTIMATED METS: 1
OHS CV CPX MAX PREDICTED HEART RATE: 165
OHS CV CPX PATIENT IS FEMALE: 1
OHS CV CPX PATIENT IS MALE: 0
OHS CV CPX PEAK DIASTOLIC BLOOD PRESSURE: 74 MMHG
OHS CV CPX PEAK HEAR RATE: 129 BPM
OHS CV CPX PEAK RATE PRESSURE PRODUCT: NORMAL
OHS CV CPX PEAK SYSTOLIC BLOOD PRESSURE: 167 MMHG
OHS CV CPX PERCENT MAX PREDICTED HEART RATE ACHIEVED: 78
OHS CV CPX RATE PRESSURE PRODUCT PRESENTING: 9020
OVALOCYTES BLD QL SMEAR: ABNORMAL
PISA TR MAX VEL: 2.29 M/S
PLATELET # BLD AUTO: 226 K/UL
PLATELET BLD QL SMEAR: ABNORMAL
PMV BLD AUTO: 9.5 FL
POTASSIUM SERPL-SCNC: 3.8 MMOL/L
PROT SERPL-MCNC: 6 G/DL
PULM VEIN A" WAVE DURATION": 101 MSEC
PV PEAK VELOCITY: 1.17 CM/S
RBC # BLD AUTO: 5.72 M/UL
RIGHT VENTRICULAR END-DIASTOLIC DIMENSION: 3.23 CM
SODIUM SERPL-SCNC: 138 MMOL/L
STOMATOCYTES BLD QL SMEAR: PRESENT
STRESS ECHO POST EXERCISE DUR MIN: 0 MIN
STRESS ECHO POST EXERCISE DUR SEC: 59
SYSTOLIC BLOOD PRESSURE: 110 MMHG
TDI LATERAL: 0.12
TDI SEPTAL: 0.07
TDI: 0.1
TR MAX PG: 20.98 MMHG
TRICUSPID ANNULAR PLANE SYSTOLIC EXCURSION: 2.99 CM
WBC # BLD AUTO: 7.3 K/UL

## 2019-02-08 PROCEDURE — 36415 COLL VENOUS BLD VENIPUNCTURE: CPT

## 2019-02-08 PROCEDURE — 63600175 PHARM REV CODE 636 W HCPCS: Performed by: SPECIALIST

## 2019-02-08 PROCEDURE — 80053 COMPREHEN METABOLIC PANEL: CPT

## 2019-02-08 PROCEDURE — 85027 COMPLETE CBC AUTOMATED: CPT

## 2019-02-08 PROCEDURE — 63600175 PHARM REV CODE 636 W HCPCS: Performed by: NURSE PRACTITIONER

## 2019-02-08 PROCEDURE — 25000003 PHARM REV CODE 250: Performed by: NURSE PRACTITIONER

## 2019-02-08 PROCEDURE — G0378 HOSPITAL OBSERVATION PER HR: HCPCS

## 2019-02-08 PROCEDURE — 85007 BL SMEAR W/DIFF WBC COUNT: CPT | Mod: NCS

## 2019-02-08 RX ORDER — GLUCOSAM/CHONDRO/HERB 149/HYAL 750-100 MG
1 TABLET ORAL 2 TIMES DAILY WITH MEALS
Qty: 180 CAPSULE | Refills: 3 | COMMUNITY
Start: 2019-02-08 | End: 2019-11-06

## 2019-02-08 RX ORDER — REGADENOSON 0.08 MG/ML
INJECTION, SOLUTION INTRAVENOUS
Status: DISCONTINUED
Start: 2019-02-08 | End: 2019-02-08 | Stop reason: HOSPADM

## 2019-02-08 RX ORDER — ACETAMINOPHEN 500 MG
1000 TABLET ORAL EVERY 6 HOURS PRN
Refills: 0 | COMMUNITY
Start: 2019-02-08 | End: 2019-11-06

## 2019-02-08 RX ORDER — LISINOPRIL 5 MG/1
5 TABLET ORAL DAILY
Qty: 30 TABLET | Refills: 0 | Status: SHIPPED | OUTPATIENT
Start: 2019-02-08 | End: 2019-11-06

## 2019-02-08 RX ORDER — REGADENOSON 0.08 MG/ML
0.4 INJECTION, SOLUTION INTRAVENOUS ONCE
Status: COMPLETED | OUTPATIENT
Start: 2019-02-08 | End: 2019-02-08

## 2019-02-08 RX ORDER — ONDANSETRON 4 MG/1
4 TABLET, FILM COATED ORAL EVERY 6 HOURS PRN
Refills: 0
Start: 2019-02-08 | End: 2019-11-06

## 2019-02-08 RX ORDER — LISINOPRIL 2.5 MG/1
5 TABLET ORAL DAILY
Status: DISCONTINUED | OUTPATIENT
Start: 2019-02-08 | End: 2019-02-08 | Stop reason: HOSPADM

## 2019-02-08 RX ADMIN — KETOROLAC TROMETHAMINE 15 MG: 30 INJECTION, SOLUTION INTRAMUSCULAR at 09:02

## 2019-02-08 RX ADMIN — LISINOPRIL 5 MG: 2.5 TABLET ORAL at 12:02

## 2019-02-08 RX ADMIN — ACETAMINOPHEN 1000 MG: 500 TABLET ORAL at 12:02

## 2019-02-08 RX ADMIN — REGADENOSON 0.4 MG: 0.08 INJECTION, SOLUTION INTRAVENOUS at 09:02

## 2019-02-08 NOTE — NURSING
Discharge instructions went over with pt. Pt verbalizes understanding and has no new questions at this time. VSS. IV removed, catheter intact. Telemetry monitor removed. AVS printed and given to pt along with printed prescriptions. Pt left unit via wheelchair.

## 2019-02-08 NOTE — PROGRESS NOTES
02/07/19 2120   Patient Assessment/Suction   Level of Consciousness (AVPU) alert   Respiratory Effort Normal;Unlabored   PRE-TX-O2   O2 Device (Oxygen Therapy) room air   SpO2 98 %   Pulse Oximetry Type Intermittent

## 2019-02-08 NOTE — PLAN OF CARE
02/08/19 1442   Final Note   Assessment Type Final Discharge Note   Anticipated Discharge Disposition Home

## 2019-02-08 NOTE — PLAN OF CARE
Notified Dr. Atkinson of stress test results. Patient is cleared for discharge from his standpoint.

## 2019-02-08 NOTE — PLAN OF CARE
Problem: Adult Inpatient Plan of Care  Goal: Plan of Care Review  Outcome: Ongoing (interventions implemented as appropriate)  Pt remained free from injury/falls this shift. VSS- no signs of any distress. Tele NSR. PRn pain medications given per order. Stress test scheduled for tomorrow. POC reviewed with pt. Pt repositioned independently, skin intact. Bed locked in lowest position, SR upx2, call light within reach. Will continue to monitor.

## 2019-02-08 NOTE — DISCHARGE SUMMARY
[facility]  Hospital Medicine  Discharge Summary    Patient Name: Alexsander Powers  MRN: [id number]  Admission Date: 2/7/2019  Hospital Length of Stay: 0 days  Discharge Date and Time:  02/08/2019 3:05 PM  Attending Physician: Dione Islas MD   Discharging Provider: CHRISTINE Hong  Primary Care Provider: Primary Doctor No    HPI:   This is a 49-year-old female with PMHx significant for bipolar disorder and prior abnormal stress test.  She presented to ED at [facility] with complaint of chest pain with onset 2 weeks ago.  She reports intermittent chest pain with activity for the last 2 weeks that has increased in severity and now occurs while at rest as well.  Associated symptoms include SOB and nausea without vomiting.  She states symptoms progressed and last night pain was 10/10 prompting her visit to the ED today.  She does report some recent heartburn.  She states the pain feels like a cylinder is sitting on her chest.  She was given nitropaste an outside hospital and reports some improvement in symptoms.  Current pain is about 4/10.  She also reports HA since nitro paste placed.  She was worked up in the past for similar symptoms after taking a diet pill her friend gave her.  She does report she recently used Adipex with last dose over a month ago.  She denies any drug use, cough, fever, chills, or change in chronic hernia related abdominal pain. She did have an abnormal stress test back in 2016, however was evaluated by Cardiology and her condition was felt not to be cardiac related.  She does report Hx of MI in her family with her father at age 40.  She was transferred to [facility] for cardiology evaluation.      * No surgery found *      Hospital Course:   The patient was monitored closely during hospitalization and kept on continuous telemetry monitoring. Cardiology was consulted. The patient  remained in SR without any signs or symptoms of arrhythmias. The patient's  troponin remained negative. She was placed on  ASA, fish oil, MVI, and PPI. Her DDimer was negative. A PA and lateral Cxr was ordered and showed the lungs are clear, with normal appearance of pulmonary vasculature and no pleural effusion or pneumothorax. The cardiac silhouette is normal in size and the hilar and mediastinal contours are unremarkable. She was noted to have a murmur and an echocardiogram was ordered. A Lexiscan stress test was done and was scintigraphically negative for ischemia or infarct. A previously described is suspected small anterior wall infarction on prior stress test was  not reproduced on this exam. The global left ventricular systolic function is preserved with an LV ejection fraction of 63 % and no evidence of LV dilatation. The patient was started on lisinopril for her hypertension. Patient was to take her blood pressure and pulse 2 x day and keep a log for follow up with PCP. She was started on MVI for microcytic anemia. She was also noted to have a long history of NSAID usage (BC powders) and was placed on PPI and instructed to follow up with GI as outpatient. The patient's overall condition remained stable and improved and she was discharged to home once cleared by Cardiology.      Consults:   Consults (From admission, onward)        Status Ordering Provider     Inpatient consult to Cardiology  Once     Provider:  MD Priyank Greenberg CAROL M.        Service: Hospital Medicine    Final Active Diagnoses:    Diagnosis Date Noted POA    PRINCIPAL PROBLEM:  Chest pain [R07.9] 06/13/2016 Yes    Benign essential HTN [I10] 02/08/2019 Yes    Hypertriglyceridemia [E78.1] 02/07/2019 Yes    Murmur [R01.1] 02/07/2019 Yes    NSAID long-term use [Z79.1] 02/07/2019 Not Applicable    Bipolar affective disorder [F31.9] 06/14/2016 Yes    Microcytic anemia [D50.9] 06/14/2016 Yes      Problems Resolved During this Admission:     Discharged Condition: stable    Disposition: Home  or Self Care    Follow Up:  Follow-up Information     PCP In 2 weeks.    Why:  Follow up for hypertension and microcytic anemia- Take blood pressure and pulse 2 x day and keep log for follow up           GI In 2 weeks.    Why:  Follow up for chronic intermittent nasuea and microcytic anemia           [facility].    Why:  to establish care with a PCP  Contact information:  [address] [place] [address]  [phone]                 Patient Instructions:      Ambulatory Referral to Gastroenterology   Referral Priority: Routine Referral Type: Consultation   Referral Reason: Specialty Services Required   Requested Specialty: Gastroenterology   Number of Visits Requested: 1     Diet Cardiac     Notify your health care provider if you experience any of the following:  temperature >100.4     Notify your health care provider if you experience any of the following:  persistent nausea and vomiting or diarrhea     Notify your health care provider if you experience any of the following:  severe uncontrolled pain     Notify your health care provider if you experience any of the following:  difficulty breathing or increased cough     Notify your health care provider if you experience any of the following:   Order Comments: Any decline in condition     Activity as tolerated     Significant Diagnostic Studies:     Ddimer negative    Drug screen panel, emergency   Status:  Final result    Ref Range & Units 02/07/19 1016   Benzodiazepines  Presumptive Positive    Methadone metabolites  Negative    Cocaine (Metab.)  Negative    Opiate Scrn, Ur  Negative    Barbiturate Screen, Ur  Negative    Amphetamine Screen, Ur  Negative    THC  Negative    Phencyclidine  Negative    Creatinine, Random Ur 15.0 - 325.0 mg/dL 319.5         X-Ray Chest PA And Lateral-  No acute abnormality.  Mild thoracolumbar scoliosis.    NM Myocardial Perfusion Spect Multi Pharmacologic-  NM MYOCARDIAL PERFUSION SPECT MULTI PHARM    CLINICAL  HISTORY:  Chest pain, normal ekg;    TECHNIQUE:  SPECT images were acquired after the injection of 15 mCi of Tc-99m tetrofosmin at rest and 29.6 mCi during a Lexiscan cardiac stress.  The clinical stress and ECG portion of the study is to be read separately.    COMPARISON:  06/15/2016    FINDINGS:  The quality of the study is good.    Stress SPECT images demonstrate homogenous distribution of the tracer throughout the left ventricle. On the resting images, there is matched homogenous distribution of the tracer throughout the left ventricle.    The post-stress images demonstrate an estimated LVEF of 63 %. The LV cavity is not dilated with an end-diastolic volume of 86 ml and an end-systolic volume of 32 ml.      Impression       1.  Scintigraphically negative for ischemia or infarct.  The previously described is suspected small anterior wall infarction is not reproduced on this exam.  2. The global left ventricular systolic function is preserved with an LV ejection fraction of 63 % and no evidence of LV dilatation.      Electronically signed by: Liu Lopez MD  Date: 02/08/2019  Time: 13:35       STRESS TEST ONLY, REGADENOSON   Conclusion     · The patient reported nausea, flushing and light headedness during the stress test.  · There were no arrhythmias during stress.  · The EKG portion of this study is abnormal but not diagnostic.  · There was no ST segment deviation noted during stress.        Stress Findings     ECG Baseline electrocardiogram reveals normal sinus rhythm at a rate of 82 bpm.   Stress Findings The patient exercised for 0 minutes and 59 seconds, corresponding to a functional capacity of 1 METS, achieving a peak heart rate of 129 bpm, which is 78% of the age predicted maximum heart rate. A pharmacological stress test was performed using regadenosonThe patient reached the end of the protocol.   The patient reported nausea, flushing and light headedness during the stress test. Onset of symptoms  occurred during stress at 1 minutes into the protocol.   Blood pressure demonstrated a normal response to stressor.   ECG Peak ECG was abnormal but not diagnostic. There was no ST segment deviation noted during stress.     The electrocardiogram revealed sinus tachycardia at peak stress.There were no arrhythmias during stress.   There were no arrhythmias during recovery.   Stress Measurements     Baseline Vitals   HR at rest 82 bpm      Systolic blood pressure 110 mmHg      Diastolic blood pressure 86 mmHg      Peak Stress Vitals   Peak  bpm      Peak Systolic  mmHg      Peak Diatolic BP 74 mmHg      Estimated METs 1       Max Predicted        Exercise Data   Exercise duration (min) 0 min      Exercise duration (sec) 59       85% Max Predicted        % Max HR Achieved 78            2D Echocardiogram- Final report pending    Labs:   CMP   Recent Labs   Lab 02/07/19  0654 02/08/19  0441    138   K 4.1 3.8    107   CO2 24 24    97   BUN 15 16   CREATININE 0.8 0.7   CALCIUM 9.2 8.6*   PROT 6.7 6.0   ALBUMIN 3.5 3.2*   BILITOT 0.4 0.3   ALKPHOS 71 63   AST 16 14   ALT 20 18   ANIONGAP 8 7*   ESTGFRAFRICA >60 >60   EGFRNONAA >60 >60   , CBC   Recent Labs   Lab 02/07/19  0654 02/08/19  0441   WBC 7.80 7.30   HGB 11.4* 10.8*   HCT 36.8* 35.1*    226   , Lipid Panel   Lab Results   Component Value Date    CHOL 172 02/07/2019    HDL 50 02/07/2019    LDLCALC 84.2 02/07/2019    TRIG 189 (H) 02/07/2019    CHOLHDL 29.1 02/07/2019   , Troponin   Recent Labs   Lab 02/07/19  1203   TROPONINI <0.006     Medications:  Reconciled Home Medications:      Medication List      START taking these medications    acetaminophen 500 MG tablet  Commonly known as:  TYLENOL  Take 2 tablets (1,000 mg total) by mouth every 6 (six) hours as needed.     lisinopril 5 MG tablet  Commonly known as:  PRINIVIL,ZESTRIL  Take 1 tablet (5 mg total) by mouth once daily.     multivitamin tablet  Commonly known  as:  THERAGRAN  Take 1 tablet by mouth once daily.  Start taking on:  2/9/2019     omega 3-dha-epa-fish oil 1,000 mg (120 mg-180 mg) Cap  Take 1 capsule by mouth 2 (two) times daily with meals.     omeprazole 20 MG capsule  Commonly known as:  PRILOSEC  Take 1 capsule (20 mg total) by mouth once daily.        CHANGE how you take these medications    ondansetron 4 MG tablet  Commonly known as:  ZOFRAN  Take 1 tablet (4 mg total) by mouth every 6 (six) hours as needed for Nausea.  What changed:    · when to take this  · reasons to take this          Indwelling Lines/Drains at time of discharge:   Lines/Drains/Airways          None        Time spent on the discharge of patient: 59 minutes  Patient was seen and examined on the date of discharge and determined to be suitable for discharge.       Jessica Jarquin, CHRISTINE  Department of Hospital Medicine  [facility]

## 2019-02-08 NOTE — CONSULTS
This 49-year-old patient was transferred from [facility] for   further care.  The patient states she has been having chest pain from past   couple of weeks.  The pain is located on the left parasternal area and radiates   to the back.  The pain was persistent with severity up to 9/10, but does not   completely resolve.  It decreases at times to 3/10 level. No obvious aggravating   or alleviating factors.  It is described as sharp heaviness.  Associated with   some dyspnea, no diaphoresis.  No cough or hemoptysis.  There is no prior   history of myocardial infarction.  The patient had a couple of stress tests in   the past.  Last stress test was apparently reported negative for reversible   ischemia; however, no further workup was done at that time.  She has done well   in the interim without any pain.    The patient has also history of bipolar disorder and use of diet pills in the   past.  History of chronic abdominal pain.    PAST MEDICAL HISTORY:  Bipolar disorder, scoliosis,     PAST SURGICAL HISTORY:  Status post ankle surgery, appendectomy, back surgery,   hysterectomy.    ALLERGIES:  PHENERGAN.    MEDICATIONS:  Include Zofran and phentermine.    FAMILY HISTORY:  Positive for cancer and heart disease.    SOCIAL HISTORY:  The patient does not smoke, drinks rarely.    PHYSICAL EXAMINATION:  GENERAL:  Shows an average built female patient in no acute distress.  VITAL SIGNS:  Temperature 98, pulse 68 per minute and regular, blood pressure   140/80.  HEENT:  Normocephalic.  Sclerae nonicteric.  NECK:  Supple.  No jugular venous distention.  Carotids 1+ without bruit.  CARDIAC:  Regular rhythm.  No murmur, gallop or rub.  There is tenderness over   the right parasternal area of the chest .  LUNGS:  Clear to auscultation.  ABDOMEN:  Soft, nontender.  Bowel sounds present.  EXTREMITIES:  No edema of feet or calf tenderness noted.    EKG from today shows sinus rhythm, within normal limits.  Chest x-ray  report not   available.  CT of the chest is negative for PE.    LABORATORY:  WBC 7.8, hemoglobin 11.4, hematocrit 36.8, platelet count is 254.    D-dimer less than 0.19, sodium 138, potassium 4.1, chloride 107, CO2 22.  Liver   functions tests normal.  Cholesterol 172, triglycerides 189, HDL 50, LDL 84.    Troponin less than 0.006 x3.    IMPRESSION:  Atypical chest pain with no EKG or enzymes elevation; however, the   patient does give history of having abnormal nuclear stress test done a couple   of years ago.  We will go ahead and repeat the stress test in the a.m.  Further   recommendations will depend on her clinical course.      /katia [id number] tea(s)          /JULITO  dd: 02/07/2019 13:13:24 (CST)  td: 02/07/2019 20:23:15 (CST)  Doc ID   #[id number]  Job ID #[id number]    CC: Bari Argueta M.D.

## 2019-02-08 NOTE — PLAN OF CARE
Problem: Adult Inpatient Plan of Care  Goal: Plan of Care Review  Outcome: Ongoing (interventions implemented as appropriate)   02/08/19 0350   Plan of Care Review   Plan of Care Reviewed With patient   POC reviewed with pt, verbalized understanding  NAD noted  VSS  PRN meds given for anxiety, pt very nervous about stress test. Reassurance provided  Denied chest pain, n/n  NPO since midnight  Denies sob  Free of fall or injury  Call light in reach  Bed in lowest position, wheels locked  Side rails up X4  Call light in reach  Will continue to monitor

## 2019-02-08 NOTE — DISCHARGE INSTRUCTIONS
Thank you for choosing [facility] for your medical care. The primary doctor who is taking care of you at the time of your discharge is Dione Islas MD.     You were admitted to the hospital with Chest pain.     Please note your discharge instructions, including diet/activity restrictions, follow-up appointments, and medication changes.  If you have any questions about your medical issues, prescriptions, or any other questions, please feel free to contact the [facility] Medicine Dept at [phone] and we will help.    Please direct all long term medication refills and follow up to your primary care provider. Thank you again for letting us take care of your health care needs.    Please note the following discharge instructions per your discharging physician-  Jessica Jarquin NP

## 2019-04-02 ENCOUNTER — HOSPITAL ENCOUNTER (EMERGENCY)
Facility: HOSPITAL | Age: 49
Discharge: HOME | End: 2019-04-02
Attending: EMERGENCY MEDICINE

## 2019-04-02 VITALS
RESPIRATION RATE: 20 BRPM | TEMPERATURE: 100 F | HEIGHT: 61 IN | SYSTOLIC BLOOD PRESSURE: 168 MMHG | DIASTOLIC BLOOD PRESSURE: 114 MMHG | BODY MASS INDEX: 33.79 KG/M2 | WEIGHT: 179 LBS | HEART RATE: 108 BPM | OXYGEN SATURATION: 98 %

## 2019-04-02 DIAGNOSIS — M25.561 RIGHT KNEE PAIN: ICD-10-CM

## 2019-04-02 DIAGNOSIS — L03.115 CELLULITIS OF RIGHT KNEE: Primary | ICD-10-CM

## 2019-04-02 PROCEDURE — 29505 APPLICATION LONG LEG SPLINT: CPT | Mod: 59,RT

## 2019-04-02 PROCEDURE — 99284 EMERGENCY DEPT VISIT MOD MDM: CPT | Mod: 25

## 2019-04-02 PROCEDURE — 63600175 PHARM REV CODE 636 W HCPCS: Performed by: PHYSICIAN ASSISTANT

## 2019-04-02 PROCEDURE — 25000003 PHARM REV CODE 250: Performed by: PHYSICIAN ASSISTANT

## 2019-04-02 PROCEDURE — 96372 THER/PROPH/DIAG INJ SC/IM: CPT

## 2019-04-02 RX ORDER — CLINDAMYCIN HYDROCHLORIDE 300 MG/1
300 CAPSULE ORAL EVERY 6 HOURS
Qty: 28 CAPSULE | Refills: 0 | Status: SHIPPED | OUTPATIENT
Start: 2019-04-02 | End: 2019-04-09

## 2019-04-02 RX ORDER — ONDANSETRON 4 MG/1
4 TABLET, ORALLY DISINTEGRATING ORAL
Status: COMPLETED | OUTPATIENT
Start: 2019-04-02 | End: 2019-04-02

## 2019-04-02 RX ORDER — KETOROLAC TROMETHAMINE 30 MG/ML
30 INJECTION, SOLUTION INTRAMUSCULAR; INTRAVENOUS
Status: COMPLETED | OUTPATIENT
Start: 2019-04-02 | End: 2019-04-02

## 2019-04-02 RX ORDER — HYDROCODONE BITARTRATE AND ACETAMINOPHEN 5; 325 MG/1; MG/1
1 TABLET ORAL
Status: COMPLETED | OUTPATIENT
Start: 2019-04-02 | End: 2019-04-02

## 2019-04-02 RX ORDER — HYDROCODONE BITARTRATE AND ACETAMINOPHEN 5; 325 MG/1; MG/1
1 TABLET ORAL EVERY 6 HOURS PRN
Qty: 18 TABLET | Refills: 0 | Status: SHIPPED | OUTPATIENT
Start: 2019-04-02 | End: 2019-11-06 | Stop reason: ALTCHOICE

## 2019-04-02 RX ORDER — ONDANSETRON 4 MG/1
4 TABLET, FILM COATED ORAL EVERY 6 HOURS PRN
Qty: 20 TABLET | Refills: 0 | Status: SHIPPED | OUTPATIENT
Start: 2019-04-02 | End: 2019-11-06

## 2019-04-02 RX ADMIN — HYDROCODONE BITARTRATE AND ACETAMINOPHEN 1 TABLET: 5; 325 TABLET ORAL at 11:04

## 2019-04-02 RX ADMIN — KETOROLAC TROMETHAMINE 30 MG: 30 INJECTION, SOLUTION INTRAMUSCULAR at 10:04

## 2019-04-02 RX ADMIN — ONDANSETRON 4 MG: 4 TABLET, ORALLY DISINTEGRATING ORAL at 11:04

## 2019-04-02 NOTE — ED PROVIDER NOTES
Encounter Date: 4/2/2019    SCRIBE #1 NOTE: I, Prieto Reece, am scribing for, and in the presence of, Aurelia Borrego PA-C.       History     Chief Complaint   Patient presents with    Knee Pain     right. denies injury       Time seen by provider: 10:12 AM on 04/02/2019    Alexsander Powers is a 49 y.o. female with a PMHx of scoliosis who presents to the ED with c/o right knee pain that began yesterday morning and has been worsening in severity since onset. The pt denies any recent trauma to the knee or having a fever. She admits to taking ibuprofen for the pain and soaking her knee in epsom salt, but denies feeling any relief. The pt states that the pain is slightly alleviated when keeping her leg straight. She denies any other symptoms at this time. Past surgical hx includes an appendectomy and a back surgery. The pt has drug allergies to Morphine and Phenergan.    The history is provided by the patient.     Review of patient's allergies indicates:   Allergen Reactions    Morphine Blisters    Phenergan [promethazine] Hives     Past Medical History:   Diagnosis Date    Bipolar affective disorder     Encounter for blood transfusion     Scoliosis     Scoliosis      Past Surgical History:   Procedure Laterality Date    ABDOMINAL SURGERY      ANKLE FRACTURE SURGERY      APPENDECTOMY      BACK SURGERY      HYSTERECTOMY       Family History   Problem Relation Age of Onset    Cancer Mother     Heart disease Father     Hypertension Father     Diabetes Father      Social History     Tobacco Use    Smoking status: Never Smoker    Smokeless tobacco: Never Used   Substance Use Topics    Alcohol use: Yes     Comment: rarely    Drug use: No     Review of Systems   Constitutional: Negative for activity change, appetite change, chills and fever.   HENT: Negative for congestion, rhinorrhea and sore throat.    Eyes: Negative for redness and visual disturbance.   Respiratory: Negative for cough, chest  tightness and shortness of breath.    Cardiovascular: Negative for chest pain.   Gastrointestinal: Negative for abdominal pain, diarrhea, nausea and vomiting.   Genitourinary: Negative for dysuria and frequency.   Musculoskeletal: Positive for arthralgias (Right knee.). Negative for back pain, neck pain and neck stiffness.   Skin: Negative for rash.   Neurological: Negative for dizziness, syncope, numbness and headaches.       Physical Exam     Initial Vitals [04/02/19 1007]   BP Pulse Resp Temp SpO2   (!) 168/114 108 20 99.8 °F (37.7 °C) 98 %      MAP       --         Physical Exam    Nursing note and vitals reviewed.  Constitutional: She appears well-developed and well-nourished. She is cooperative.  Non-toxic appearance. She does not have a sickly appearance.   HENT:   Head: Normocephalic and atraumatic.   Right Ear: External ear normal.   Left Ear: External ear normal.   Nose: Nose normal.   Eyes: Conjunctivae and lids are normal.   Neck: Normal range of motion and full passive range of motion without pain. Neck supple.   Cardiovascular: Normal rate, regular rhythm and normal heart sounds. Exam reveals no gallop and no friction rub.    No murmur heard.  Pulses:       Dorsalis pedis pulses are 2+ on the right side, and 2+ on the left side.   Pulmonary/Chest: Breath sounds normal. She has no wheezes. She has no rhonchi. She has no rales.   Abdominal: Normal appearance.   Musculoskeletal:        Right knee: She exhibits decreased range of motion and erythema. Tenderness found.   Tenderness to the right patella with a well demarcated area of erythema and warmth to the area. No joint line tenderness or calf tenderness.  Negative Homans sign. No palpable cord.    Neurological: She is alert.   Skin: Skin is warm, dry and intact. No rash noted.         ED Course   Procedures  Labs Reviewed - No data to display       Imaging Results          X-Ray Knee 3 View Right (Final result)  Result time 04/02/19 11:01:52    Final  result by Juanjo Lance Jr., MD (04/02/19 11:01:52)                 Impression:      Negative radiographs of the right knee.      Electronically signed by: Juanjo Lance MD  Date:    04/02/2019  Time:    11:01             Narrative:    EXAMINATION:  XR KNEE 3 VIEW RIGHT    CLINICAL HISTORY:  Pain in right knee    TECHNIQUE:  AP, lateral, and Merchant views of the right knee were performed.    COMPARISON:  None    FINDINGS:  A fracture of the femur, patella, tibia or fibula is not seen.  The joint spaces are maintained.  No joint effusion is seen.  No subluxation is noted.                                 Medical Decision Making:   History:   Old Medical Records: I decided to obtain old medical records.  Clinical Tests:   Radiological Study: Ordered and Reviewed       APC / Resident Notes:   Urgent evaluation of a 49-year-old female who presents with atraumatic right knee pain.  She has a well-demarcated area of erythema.  She is neurovascularly intact.  She is not febrile.  X-ray shows no acute fracture.  Suspect overlying cellulitis versus bursitis.  I doubt septic joint.  Will treat with clindamycin and close follow-up with Orthopedics. Discussed results with patient. Return precautions given. Based on my clinical evaluation, I do not appreciate any immediate, emergent, or life threatening condition or etiology that warrants additional workup today and feel that the patient can be discharged with close follow up care.  Patient is to follow up with their primary care provider. Case was discussed with Dr. Shields who has evaluated the patient and is in agreement with the plan of care. All questions answered.          Scribe Attestation:   Scribe #1: I performed the above scribed service and the documentation accurately describes the services I performed. I attest to the accuracy of the note.    Attending Attestation:     Physician Attestation Statement for NP/PA:   I have conducted a face to face encounter  with this patient in addition to the NP/PA, due to Medical Complexity    Other NP/PA Attestation Additions:    History of Present Illness: 49-year-old female presented with a chief complaint of knee pain.   Physical Exam: Tenderness, and focal area of redness noted below the knee, along the tibial plateau region.   Medical Decision Making: Initial differential diagnosis included but not limited to bursitis, cellulitis, and abscess.  The patient's x-ray showed no acute bony abnormalities.  I suspect the patient has a bursitis versus cellulitis.  The patient will be discharged home with p.o. antibiotics p.o. pain medication.  She to follow up with primary care/orthopedics as an outpatient.  I am in agreement with the physician assistant's  assessment, treatment, and plan of care.         I, Aurelia Borrego PA-C, personally performed the services described in this documentation. All medical record entries made by the scribe were at my direction and in my presence.  I have reviewed the chart and agree that the record reflects my personal performance and is accurate and complete. Aurelia Borrego PA-C.  4:53 PM 04/02/2019             Clinical Impression:       ICD-10-CM ICD-9-CM   1. Cellulitis of right knee L03.115 682.6   2. Right knee pain M25.561 719.46         Disposition:   Disposition: Discharged  Condition: Stable                        Aurelia Borrego PA-C  04/02/19 1714       Praneeth Shields MD  04/02/19 [id number]

## 2019-04-02 NOTE — DISCHARGE INSTRUCTIONS
Take antibiotics as prescribed.  Use brace and crutches for comfort.  Keep leg elevate. Take tylenol and motrin as needed for pain.  Follow up with your primary care provider or orthopedics if symptoms persist.  For worsening symptoms, chest pain, shortness of breath, increased abdominal pain, high grade fever, stroke or stroke like symptoms, immediately go to the nearest Emergency Room or call 911 as soon as possible.

## 2019-04-03 ENCOUNTER — PES CALL (OUTPATIENT)
Dept: ADMINISTRATIVE | Facility: CLINIC | Age: 49
End: 2019-04-03

## 2019-11-06 ENCOUNTER — HOSPITAL ENCOUNTER (OUTPATIENT)
Facility: HOSPITAL | Age: 49
Discharge: HOME | End: 2019-11-07
Attending: EMERGENCY MEDICINE | Admitting: INTERNAL MEDICINE

## 2019-11-06 DIAGNOSIS — R60.9 SWELLING: ICD-10-CM

## 2019-11-06 DIAGNOSIS — R07.9 CHEST PAIN: Primary | ICD-10-CM

## 2019-11-06 LAB
ALBUMIN SERPL BCP-MCNC: 3.8 G/DL (ref 3.5–5.2)
ALP SERPL-CCNC: 61 U/L (ref 55–135)
ALT SERPL W/O P-5'-P-CCNC: 26 U/L (ref 10–44)
ANION GAP SERPL CALC-SCNC: 11 MMOL/L (ref 8–16)
AST SERPL-CCNC: 22 U/L (ref 10–40)
BASOPHILS # BLD AUTO: 0.05 K/UL (ref 0–0.2)
BASOPHILS NFR BLD: 0.4 % (ref 0–1.9)
BILIRUB SERPL-MCNC: 0.5 MG/DL (ref 0.1–1)
BNP SERPL-MCNC: 26 PG/ML (ref 0–99)
BUN SERPL-MCNC: 14 MG/DL (ref 6–20)
CALCIUM SERPL-MCNC: 9 MG/DL (ref 8.7–10.5)
CHLORIDE SERPL-SCNC: 105 MMOL/L (ref 95–110)
CO2 SERPL-SCNC: 22 MMOL/L (ref 23–29)
CREAT SERPL-MCNC: 0.8 MG/DL (ref 0.5–1.4)
DIFFERENTIAL METHOD: ABNORMAL
EOSINOPHIL # BLD AUTO: 0.4 K/UL (ref 0–0.5)
EOSINOPHIL NFR BLD: 3 % (ref 0–8)
ERYTHROCYTE [DISTWIDTH] IN BLOOD BY AUTOMATED COUNT: 15.9 % (ref 11.5–14.5)
EST. GFR  (AFRICAN AMERICAN): >60 ML/MIN/1.73 M^2
EST. GFR  (NON AFRICAN AMERICAN): >60 ML/MIN/1.73 M^2
GLUCOSE SERPL-MCNC: 99 MG/DL (ref 70–110)
HCT VFR BLD AUTO: 35.4 % (ref 37–48.5)
HGB BLD-MCNC: 10.8 G/DL (ref 12–16)
IMM GRANULOCYTES # BLD AUTO: 0.06 K/UL (ref 0–0.04)
IMM GRANULOCYTES NFR BLD AUTO: 0.5 % (ref 0–0.5)
INR PPP: 0.9
LYMPHOCYTES # BLD AUTO: 3.6 K/UL (ref 1–4.8)
LYMPHOCYTES NFR BLD: 30.6 % (ref 18–48)
MCH RBC QN AUTO: 18.9 PG (ref 27–31)
MCHC RBC AUTO-ENTMCNC: 30.5 G/DL (ref 32–36)
MCV RBC AUTO: 62 FL (ref 82–98)
MONOCYTES # BLD AUTO: 0.8 K/UL (ref 0.3–1)
MONOCYTES NFR BLD: 7.1 % (ref 4–15)
NEUTROPHILS # BLD AUTO: 7 K/UL (ref 1.8–7.7)
NEUTROPHILS NFR BLD: 58.4 % (ref 38–73)
NRBC BLD-RTO: 0 /100 WBC
PLATELET # BLD AUTO: 260 K/UL (ref 150–350)
PMV BLD AUTO: ABNORMAL FL (ref 9.2–12.9)
POTASSIUM SERPL-SCNC: 3.5 MMOL/L (ref 3.5–5.1)
PROT SERPL-MCNC: 7.2 G/DL (ref 6–8.4)
PROTHROMBIN TIME: 12.2 SEC (ref 10.6–14.8)
RBC # BLD AUTO: 5.72 M/UL (ref 4–5.4)
SODIUM SERPL-SCNC: 138 MMOL/L (ref 136–145)
TROPONIN I SERPL DL<=0.01 NG/ML-MCNC: <0.03 NG/ML (ref 0.02–0.04)
WBC # BLD AUTO: 11.9 K/UL (ref 3.9–12.7)

## 2019-11-06 PROCEDURE — 84484 ASSAY OF TROPONIN QUANT: CPT

## 2019-11-06 PROCEDURE — 80053 COMPREHEN METABOLIC PANEL: CPT

## 2019-11-06 PROCEDURE — 93005 ELECTROCARDIOGRAM TRACING: CPT

## 2019-11-06 PROCEDURE — 96374 THER/PROPH/DIAG INJ IV PUSH: CPT

## 2019-11-06 PROCEDURE — 99285 EMERGENCY DEPT VISIT HI MDM: CPT | Mod: 25

## 2019-11-06 PROCEDURE — 83880 ASSAY OF NATRIURETIC PEPTIDE: CPT

## 2019-11-06 PROCEDURE — 85025 COMPLETE CBC W/AUTO DIFF WBC: CPT

## 2019-11-06 PROCEDURE — 85610 PROTHROMBIN TIME: CPT

## 2019-11-06 RX ORDER — PHENTERMINE HYDROCHLORIDE 37.5 MG/1
37.5 TABLET ORAL DAILY
COMMUNITY
End: 2020-08-27

## 2019-11-07 VITALS
OXYGEN SATURATION: 98 % | RESPIRATION RATE: 18 BRPM | HEIGHT: 61 IN | BODY MASS INDEX: 37.5 KG/M2 | DIASTOLIC BLOOD PRESSURE: 84 MMHG | HEART RATE: 78 BPM | TEMPERATURE: 98 F | WEIGHT: 198.63 LBS | SYSTOLIC BLOOD PRESSURE: 138 MMHG

## 2019-11-07 PROBLEM — I10 HYPERTENSION: Status: ACTIVE | Noted: 2019-11-07

## 2019-11-07 LAB
TROPONIN I SERPL DL<=0.01 NG/ML-MCNC: <0.03 NG/ML (ref 0.02–0.04)
TROPONIN I SERPL DL<=0.01 NG/ML-MCNC: <0.03 NG/ML (ref 0.02–0.04)

## 2019-11-07 PROCEDURE — 84484 ASSAY OF TROPONIN QUANT: CPT

## 2019-11-07 PROCEDURE — 36415 COLL VENOUS BLD VENIPUNCTURE: CPT

## 2019-11-07 PROCEDURE — G0378 HOSPITAL OBSERVATION PER HR: HCPCS

## 2019-11-07 PROCEDURE — 63600175 PHARM REV CODE 636 W HCPCS: Performed by: EMERGENCY MEDICINE

## 2019-11-07 PROCEDURE — 94760 N-INVAS EAR/PLS OXIMETRY 1: CPT

## 2019-11-07 PROCEDURE — 25500020 PHARM REV CODE 255: Performed by: EMERGENCY MEDICINE

## 2019-11-07 RX ORDER — BISACODYL 10 MG
10 SUPPOSITORY, RECTAL RECTAL DAILY PRN
Status: DISCONTINUED | OUTPATIENT
Start: 2019-11-07 | End: 2019-11-07 | Stop reason: HOSPADM

## 2019-11-07 RX ORDER — ASPIRIN 325 MG
325 TABLET ORAL
Status: DISCONTINUED | OUTPATIENT
Start: 2019-11-07 | End: 2019-11-07

## 2019-11-07 RX ORDER — NITROGLYCERIN 0.4 MG/1
0.4 TABLET SUBLINGUAL EVERY 5 MIN PRN
Status: DISCONTINUED | OUTPATIENT
Start: 2019-11-07 | End: 2019-11-07 | Stop reason: HOSPADM

## 2019-11-07 RX ORDER — ONDANSETRON 2 MG/ML
4 INJECTION INTRAMUSCULAR; INTRAVENOUS EVERY 8 HOURS PRN
Status: DISCONTINUED | OUTPATIENT
Start: 2019-11-07 | End: 2019-11-07 | Stop reason: HOSPADM

## 2019-11-07 RX ORDER — ACETAMINOPHEN 325 MG/1
650 TABLET ORAL EVERY 4 HOURS PRN
Status: DISCONTINUED | OUTPATIENT
Start: 2019-11-07 | End: 2019-11-07 | Stop reason: HOSPADM

## 2019-11-07 RX ORDER — NAPROXEN SODIUM 220 MG/1
81 TABLET, FILM COATED ORAL DAILY
Status: DISCONTINUED | OUTPATIENT
Start: 2019-11-07 | End: 2019-11-07 | Stop reason: HOSPADM

## 2019-11-07 RX ORDER — HYDRALAZINE HYDROCHLORIDE 20 MG/ML
10 INJECTION INTRAMUSCULAR; INTRAVENOUS EVERY 6 HOURS PRN
Status: DISCONTINUED | OUTPATIENT
Start: 2019-11-07 | End: 2019-11-07 | Stop reason: HOSPADM

## 2019-11-07 RX ORDER — HYDROMORPHONE HYDROCHLORIDE 1 MG/ML
0.5 INJECTION, SOLUTION INTRAMUSCULAR; INTRAVENOUS; SUBCUTANEOUS
Status: COMPLETED | OUTPATIENT
Start: 2019-11-07 | End: 2019-11-07

## 2019-11-07 RX ORDER — SODIUM CHLORIDE 0.9 % (FLUSH) 0.9 %
3 SYRINGE (ML) INJECTION
Status: DISCONTINUED | OUTPATIENT
Start: 2019-11-07 | End: 2019-11-07 | Stop reason: HOSPADM

## 2019-11-07 RX ORDER — NITROGLYCERIN 0.4 MG/1
0.4 TABLET SUBLINGUAL
Status: COMPLETED | OUTPATIENT
Start: 2019-11-07 | End: 2019-11-07

## 2019-11-07 RX ADMIN — NITROGLYCERIN 0.4 MG: 0.4 TABLET, ORALLY DISINTEGRATING SUBLINGUAL at 01:11

## 2019-11-07 RX ADMIN — IOHEXOL 100 ML: 350 INJECTION, SOLUTION INTRAVENOUS at 01:11

## 2019-11-07 RX ADMIN — HYDROMORPHONE HYDROCHLORIDE 0.5 MG: 1 INJECTION, SOLUTION INTRAMUSCULAR; INTRAVENOUS; SUBCUTANEOUS at 01:11

## 2019-11-07 NOTE — ED PROVIDER NOTES
Encounter Date: 11/6/2019       History     Chief Complaint   Patient presents with    Chest Pain     Just prior to EMS arrival.      50 yo woman with PMH HTN, Bipolar Disorder presents to the ED with chest pain. The patient states that she has had intermittent episodes of chest pressure and SOB over the past 2 weeks. However, tonight approximately 2 hours PTA the patient had an episode of severe chest pain that she describes as tightness that radiated into her shoulders and back. The pain was associated with severe SOB and the patient almost passed out. She states the pain has improved slightly since that time, but she has persistent chest tightness. Additionally, she has LLE leg pain and swelling. No recent prolonged periods of immobilization, no h/o VTE, no exogenous estrogens, no h/o malignancy. Not a smoker. The patient's father had his first MI in his 40s.        Review of patient's allergies indicates:   Allergen Reactions    Morphine Blisters    Phenergan [promethazine] Hives     Past Medical History:   Diagnosis Date    Bipolar affective disorder     Encounter for blood transfusion     Scoliosis     Scoliosis      Past Surgical History:   Procedure Laterality Date    ABDOMINAL SURGERY      ANKLE FRACTURE SURGERY      APPENDECTOMY      BACK SURGERY      HYSTERECTOMY       Family History   Problem Relation Age of Onset    Cancer Mother     Heart disease Father     Hypertension Father     Diabetes Father      Social History     Tobacco Use    Smoking status: Never Smoker    Smokeless tobacco: Never Used   Substance Use Topics    Alcohol use: Yes     Comment: rarely    Drug use: No     Review of Systems   Constitutional: Negative for chills, diaphoresis, fatigue and fever.   HENT: Negative for congestion.    Eyes: Negative for visual disturbance.   Respiratory: Positive for shortness of breath. Negative for cough, wheezing and stridor.    Cardiovascular: Positive for chest pain. Negative for  palpitations.   Gastrointestinal: Negative for abdominal distention, diarrhea, nausea and vomiting.   Genitourinary: Negative for dysuria, frequency, hematuria and urgency.   Musculoskeletal: Negative for back pain.   Skin: Negative for pallor.   Neurological: Positive for light-headedness. Negative for weakness, numbness and headaches.   Psychiatric/Behavioral: Negative for confusion.   All other systems reviewed and are negative.      Physical Exam     Initial Vitals [11/06/19 2217]   BP Pulse Resp Temp SpO2   (!) 170/92 73 20 98.2 °F (36.8 °C) 100 %      MAP       --         Physical Exam    Nursing note and vitals reviewed.  Constitutional: She appears well-developed and well-nourished. No distress.   HENT:   Head: Normocephalic and atraumatic.   Eyes: EOM are normal.   Neck: Normal range of motion. Neck supple.   Cardiovascular: Normal rate, regular rhythm, normal heart sounds and intact distal pulses.   Pulmonary/Chest: Breath sounds normal. She has no wheezes. She has no rhonchi. She has no rales.   Abdominal: Soft. Bowel sounds are normal. She exhibits no distension. There is no tenderness. There is no rebound.   Musculoskeletal: Normal range of motion.   Neurological: She is alert and oriented to person, place, and time. She has normal strength. No sensory deficit.   Skin: Skin is warm. Capillary refill takes less than 2 seconds.   Psychiatric: Thought content normal.         ED Course   Procedures  Labs Reviewed   CBC W/ AUTO DIFFERENTIAL - Abnormal; Notable for the following components:       Result Value    RBC 5.72 (*)     Hemoglobin 10.8 (*)     Hematocrit 35.4 (*)     Mean Corpuscular Volume 62 (*)     Mean Corpuscular Hemoglobin 18.9 (*)     Mean Corpuscular Hemoglobin Conc 30.5 (*)     RDW 15.9 (*)     Immature Grans (Abs) 0.06 (*)     All other components within normal limits   COMPREHENSIVE METABOLIC PANEL - Abnormal; Notable for the following components:    CO2 22 (*)     All other components  within normal limits   B-TYPE NATRIURETIC PEPTIDE   TROPONIN I   PROTIME-INR     EKG Readings: (Independently Interpreted)   NSR a rate of 68 beats per minute with no ST elevations or depressions, normal intervals, no STEMI       Imaging Results          X-Ray Chest AP Portable (In process)               X-Rays:   Independently Interpreted Readings:   Chest X-Ray: No acute abnormalities.     Medical Decision Making:   ED Management:  49F with CP. Ddx includes angina, ACS, PE, dissection, PNA, pneumothorax, MSK pain, rib fracture, anxiety, pericardial effusion. Labs remarakble for microcytic anemia, otherwise, wnl. ECG nonischemic.  Chest x-ray unremarkable.  Ultrasound negative and CT negative.  Given risk factors and concerning history will admit for serial enzymes and further evaluation.    Hamida Weinberg MD  Emergency Medicine  11/07/2019 3:24 AM                                   Clinical Impression:       ICD-10-CM ICD-9-CM   1. Chest pain R07.9 786.50   2. Swelling R60.9 782.3                             Hamida Weinberg MD  11/07/19 0324

## 2019-11-07 NOTE — HPI
"50 yo woman with PMH HTN, Bipolar Disorder, and Thallasemia presented to the ED with chest pain.    Location: Mid chest  Quality:  Pressure/tightness, "someone sitting on my chest"  Radiation:  To back  Onset:  Gradual  Aggravating factors: None  Alleviating factors: Aspirin and NTG  Timing: Intermittent/brief (few minutes)  for the past 2 weeks, sudden and severe 2 hrs PTA  Severity: Moderate - severe  Associated symptoms: SOB, "like running a marathon"  Co morbidities:  Hypertension - not taking any medication  Other factors:  Denies recreational drug used. Father had MI in his 40s.  Prior diagnostic studies: Negative stress test 2/2019    Currently, she denies pain or shortness of breath.  She denies fever, chills, cough, abdominal pain, urinary symptoms.  She reports 10 lb weight gain in the past 2 months.  She has left leg pain and swelling.    In the ED  Afebrile,  - 174  Labs:  Unremarkable  Troponin:  Normal  EKG, personally reviewed, sinus rhythm with no acute changes compare with EKG on 02/07/2019  CXR, personally reviewed, no obvious infiltrates or overt heart failure  CTA chest: No acute pathology is detected in the chest. No evidence of pulmonary thromboembolism.  US LEs; No DVT          "

## 2019-11-07 NOTE — ASSESSMENT & PLAN NOTE
Stable  Results for SARI TRIPATIH (MRN [id number]) as of 11/7/2019 05:15   Ref. Range 6/15/2016 09:48 5/9/2018 19:35 2/7/2019 06:54 2/8/2019 04:41 11/6/2019 22:55   Hemoglobin Latest Ref Range: 12.0 - 16.0 g/dL 10.0 (L) 11.3 (L) 11.4 (L) 10.8 (L) 10.8 (L)

## 2019-11-07 NOTE — SUBJECTIVE & OBJECTIVE
Past Medical History:   Diagnosis Date    Bipolar affective disorder     Encounter for blood transfusion     Scoliosis     Scoliosis        Past Surgical History:   Procedure Laterality Date    ABDOMINAL SURGERY      ANKLE FRACTURE SURGERY      APPENDECTOMY      BACK SURGERY      HYSTERECTOMY         Review of patient's allergies indicates:   Allergen Reactions    Morphine Blisters    Phenergan [promethazine] Hives       No current facility-administered medications on file prior to encounter.      Current Outpatient Medications on File Prior to Encounter   Medication Sig    phentermine (ADIPEX-P) 37.5 mg tablet Take 37.5 mg by mouth once daily.     Family History     Problem Relation (Age of Onset)    Cancer Mother    Diabetes Father    Heart disease Father    Hypertension Father        Tobacco Use    Smoking status: Never Smoker    Smokeless tobacco: Never Used   Substance and Sexual Activity    Alcohol use: Yes     Comment: rarely    Drug use: No    Sexual activity: Yes     Birth control/protection: Surgical     Review of Systems   All other systems reviewed and are negative.    Objective:     Vital Signs (Most Recent):  Temp: 98.2 °F (36.8 °C) (11/06/19 2217)  Pulse: 69 (11/07/19 0300)  Resp: 18 (11/07/19 0300)  BP: 137/65 (11/07/19 0300)  SpO2: 98 % (11/07/19 0300) Vital Signs (24h Range):  Temp:  [98.2 °F (36.8 °C)] 98.2 °F (36.8 °C)  Pulse:  [69-82] 69  Resp:  [16-20] 18  SpO2:  [98 %-100 %] 98 %  BP: (137-174)/(65-92) 137/65     Weight: 87.1 kg (192 lb)  Body mass index is 36.28 kg/m².    Physical Exam   Constitutional: She is oriented to person, place, and time. She appears well-developed and well-nourished. No distress.   HENT:   Head: Normocephalic and atraumatic.   Eyes: EOM are normal. Right eye exhibits no discharge. Left eye exhibits no discharge.   Neck: Neck supple.   Cardiovascular: Normal rate and regular rhythm. Exam reveals no gallop.   No murmur heard.  Pulmonary/Chest: Effort  normal and breath sounds normal.   Abdominal: Soft. Bowel sounds are normal.   Genitourinary: Vagina normal.   Musculoskeletal: Normal range of motion. She exhibits tenderness (Left leg).   Neurological: She is alert and oriented to person, place, and time.   Skin: Skin is warm and dry. Capillary refill takes less than 2 seconds. She is not diaphoretic.   Multiple tattoos on body   Psychiatric:   Anxious   Vitals reviewed.        CRANIAL NERVES     CN III, IV, VI   Extraocular motions are normal.        Significant Labs:   CBC:   Recent Labs   Lab 11/06/19 2255   WBC 11.90   HGB 10.8*   HCT 35.4*        CMP:   Recent Labs   Lab 11/06/19 2255      K 3.5      CO2 22*   GLU 99   BUN 14   CREATININE 0.8   CALCIUM 9.0   PROT 7.2   ALBUMIN 3.8   BILITOT 0.5   ALKPHOS 61   AST 22   ALT 26   ANIONGAP 11   EGFRNONAA >60.0     Cardiac Markers:   Recent Labs   Lab 11/06/19 2255   BNP 26     Troponin:   Recent Labs   Lab 11/06/19 2255   TROPONINI <0.030       Significant Imaging: I have reviewed all pertinent imaging results/findings within the past 24 hours.     .  Cta Chest Non-coronary (pe Study)    Result Date: 11/7/2019  Exam: CTA OF THE CHEST WITH CONTRAST Clinical data: Acute onset chest pain/shortness of breath/near syncope with swollen left lower extremity. Technique: Axial CT angiography images through the lungs were acquired with contrast and imaged using soft tissue and lung algorithms. Coronal, sagittal, and 3D volume reconstructions were performed. Reformatted/3D-MPR images were performed. Contrast: Omnipaque-350.  Amount: 100 mL. Radiation dose: CTDIvol = 30.00 mGy, DLP = 414.70 mGy x cm. Prior studies: No prior studies submitted. Findings: Lungs: No pulmonary infiltrate identified. No pulmonary mass identified. No pleural effusions identified. No pneumothorax. The airway is clear. Soft Tissues: No mediastinal, axillary or supraclavicular adenopathy is identified. Vascular: No filling  defect within the pulmonary arteries to the segmental branch level. Unremarkable aorta. Grossly unremarkable sized heart.  No definite abnormality seen on 3D reformatted images. Bony structures: No acute or destructive abnormality Upper Abdomen: Mild hepatosplenomegaly. Mild fatty liver. IMPRESSION: 1. No acute pathology is detected in the chest. No evidence of pulmonary thromboembolism. 2. Mild hepatosplenomegaly. Mild fatty liver. Recommendation:  Follow up as clinically indicated. All CT scans at this facility utilize dose modulation, iterative reconstruction, and/or weight based dosing when appropriate to reduce radiation dose to as low as reasonably achievable. Electronically Signed by KESHIA GUO MD at 11/7/2019 3:31:10 AM    Us Lower Extremity Veins Bilateral    Result Date: 11/7/2019  Exam: UNILATERAL BILATERAL LOWER EXTREMITY VENOUS DUPLEX DOPPLER Clinical data: Bilateral lower extremity pain, more on left with recurrent swelling. Technique: Real time ultrasound and color Doppler imaging of the veins of the bilateral lower extremities were performed using grayscale, color flow and duplex Doppler. Vessels imaged: common femoral veins, superficial femoral veins, popliteal veins. Prior studies: No prior studies submitted. Findings: The lower extremity veins demonstrate no evidence of intraluminal echogenicity with normal compressibility, spontaneous blood flow, augmentation and respiratory phasicity. No dilated veins or wall thickening seen. The superficial veins visualized and soft tissues are unremarkable. Negative for DVT in bilateral lower extremities. IMPRESSION: No evidence of deep venous thrombosis bilaterally. Recommendation:   Follow up as clinically indicated. Electronically Signed by VENITA MARTINEZ MD at 11/7/2019 5:07:22 AM

## 2019-11-07 NOTE — H&P
"[facility]  History & Physical  Date of Service: 11/7/2019 at 0400    Patient Name: Alexsander Powers  MRN: [id number]  Admission Date: 11/6/2019  Attending Physician: Odette Erazo MD  Primary Care Provider: Primary Doctor No         Patient information was obtained from Patient, medical records, and ED record    Subjective:     Principal Problem:Chest pain    Chief Complaint:   Chief Complaint   Patient presents with    Chest Pain     Just prior to EMS arrival.         HPI: 50 yo woman with PMH HTN, Bipolar Disorder, and Thallasemia presented to the ED with chest pain.    Location: Mid chest  Quality:  Pressure/tightness, "someone sitting on my chest"  Radiation:  To back  Onset:  Gradual  Aggravating factors: None  Alleviating factors: Aspirin and NTG?  Timing: Intermittent/brief (few minutes)  for the past 2 weeks, sudden and severe 2 hrs PTA  Severity: Moderate - severe  Associated symptoms: SOB, "like running a marathon"  Co morbidities:  Hypertension - not taking any medication  Other factors:  Denies recreational drug used. Father had MI in his 40s.  Prior diagnostic studies: Negative stress test 2/2019    She denies fever, chills, cough, abdominal pain, urinary symptoms.  She reports 10 lb weight gain in the past 2 months.  She has left leg pain and swelling.    In the ED  Afebrile,  - 174  Labs:  Unremarkable  Troponin:  Normal  EKG, personally reviewed, sinus rhythm with no acute changes compare with EKG on 02/07/2019  CXR, personally reviewed, no obvious infiltrates or overt heart failure  CTA chest: No acute pathology is detected in the chest. No evidence of pulmonary thromboembolism.  US LEs; No DVT            Past Medical History:   Diagnosis Date    Bipolar affective disorder     Encounter for blood transfusion     Scoliosis     Scoliosis        Past Surgical History:   Procedure Laterality Date    ABDOMINAL SURGERY      ANKLE FRACTURE SURGERY      " APPENDECTOMY      BACK SURGERY      HYSTERECTOMY         Review of patient's allergies indicates:   Allergen Reactions    Morphine Blisters    Phenergan [promethazine] Hives       No current facility-administered medications on file prior to encounter.      Current Outpatient Medications on File Prior to Encounter   Medication Sig    phentermine (ADIPEX-P) 37.5 mg tablet Take 37.5 mg by mouth once daily.     Family History     Problem Relation (Age of Onset)    Cancer Mother    Diabetes Father    Heart disease Father    Hypertension Father        Tobacco Use    Smoking status: Never Smoker    Smokeless tobacco: Never Used   Substance and Sexual Activity    Alcohol use: Yes     Comment: rarely    Drug use: No    Sexual activity: Yes     Birth control/protection: Surgical     Review of Systems   All other systems reviewed and are negative.    Objective:     Vital Signs (Most Recent):  Temp: 98.2 °F (36.8 °C) (11/06/19 2217)  Pulse: 69 (11/07/19 0300)  Resp: 18 (11/07/19 0300)  BP: 137/65 (11/07/19 0300)  SpO2: 98 % (11/07/19 0300) Vital Signs (24h Range):  Temp:  [98.2 °F (36.8 °C)] 98.2 °F (36.8 °C)  Pulse:  [69-82] 69  Resp:  [16-20] 18  SpO2:  [98 %-100 %] 98 %  BP: (137-174)/(65-92) 137/65     Weight: 87.1 kg (192 lb)  Body mass index is 36.28 kg/m².    Physical Exam   Constitutional: She is oriented to person, place, and time. She appears well-developed and well-nourished. No distress.   HENT:   Head: Normocephalic and atraumatic.   Eyes: EOM are normal. Right eye exhibits no discharge. Left eye exhibits no discharge.   Neck: Neck supple.   Cardiovascular: Normal rate and regular rhythm. Exam reveals no gallop.   No murmur heard.  Pulmonary/Chest: Effort normal and breath sounds normal.   Abdominal: Soft. Bowel sounds are normal.   Genitourinary: Vagina normal.   Musculoskeletal: Normal range of motion. She exhibits tenderness (Left leg).   Neurological: She is alert and oriented to person, place, and  time.   Skin: Skin is warm and dry. Capillary refill takes less than 2 seconds. She is not diaphoretic.   Multiple tattoos on body   Psychiatric:   Anxious   Vitals reviewed.        CRANIAL NERVES     CN III, IV, VI   Extraocular motions are normal.        Significant Labs:   CBC:   Recent Labs   Lab 11/06/19 2255   WBC 11.90   HGB 10.8*   HCT 35.4*        CMP:   Recent Labs   Lab 11/06/19 2255      K 3.5      CO2 22*   GLU 99   BUN 14   CREATININE 0.8   CALCIUM 9.0   PROT 7.2   ALBUMIN 3.8   BILITOT 0.5   ALKPHOS 61   AST 22   ALT 26   ANIONGAP 11   EGFRNONAA >60.0     Cardiac Markers:   Recent Labs   Lab 11/06/19 2255   BNP 26     Troponin:   Recent Labs   Lab 11/06/19 2255   TROPONINI <0.030       Significant Imaging: I have reviewed all pertinent imaging results/findings within the past 24 hours.     .  Cta Chest Non-coronary (pe Study)    Result Date: 11/7/2019  Exam: CTA OF THE CHEST WITH CONTRAST Clinical data: Acute onset chest pain/shortness of breath/near syncope with swollen left lower extremity. Technique: Axial CT angiography images through the lungs were acquired with contrast and imaged using soft tissue and lung algorithms. Coronal, sagittal, and 3D volume reconstructions were performed. Reformatted/3D-MPR images were performed. Contrast: Omnipaque-350.  Amount: 100 mL. Radiation dose: CTDIvol = 30.00 mGy, DLP = 414.70 mGy x cm. Prior studies: No prior studies submitted. Findings: Lungs: No pulmonary infiltrate identified. No pulmonary mass identified. No pleural effusions identified. No pneumothorax. The airway is clear. Soft Tissues: No mediastinal, axillary or supraclavicular adenopathy is identified. Vascular: No filling defect within the pulmonary arteries to the segmental branch level. Unremarkable aorta. Grossly unremarkable sized heart.  No definite abnormality seen on 3D reformatted images. Bony structures: No acute or destructive abnormality Upper Abdomen: Mild  hepatosplenomegaly. Mild fatty liver. IMPRESSION: 1. No acute pathology is detected in the chest. No evidence of pulmonary thromboembolism. 2. Mild hepatosplenomegaly. Mild fatty liver. Recommendation:  Follow up as clinically indicated. All CT scans at this facility utilize dose modulation, iterative reconstruction, and/or weight based dosing when appropriate to reduce radiation dose to as low as reasonably achievable. Electronically Signed by KESHIA GUO MD at 11/7/2019 3:31:10 AM    Us Lower Extremity Veins Bilateral    Result Date: 11/7/2019  Exam: UNILATERAL BILATERAL LOWER EXTREMITY VENOUS DUPLEX DOPPLER Clinical data: Bilateral lower extremity pain, more on left with recurrent swelling. Technique: Real time ultrasound and color Doppler imaging of the veins of the bilateral lower extremities were performed using grayscale, color flow and duplex Doppler. Vessels imaged: common femoral veins, superficial femoral veins, popliteal veins. Prior studies: No prior studies submitted. Findings: The lower extremity veins demonstrate no evidence of intraluminal echogenicity with normal compressibility, spontaneous blood flow, augmentation and respiratory phasicity. No dilated veins or wall thickening seen. The superficial veins visualized and soft tissues are unremarkable. Negative for DVT in bilateral lower extremities. IMPRESSION: No evidence of deep venous thrombosis bilaterally. Recommendation:   Follow up as clinically indicated. Electronically Signed by VENITA MARTINEZ MD at 11/7/2019 5:07:22 AM        Assessment/Plan:     * Chest pain  Cardiac monitor for arrhythmia  Trend troponin  Had a negative stress test 2/2019  Consult Cardiology  NPO for possible cardiac workup  Aspirin and nitroglycerin      Hypertension  Monitor  Hydralazine IV p.r.n.  May need routine antihypertensive medication      Microcytic anemia  Stable  Results for SARI TRIPATHI (MRN [id number]) as of 11/7/2019 05:15   Ref. Range 6/15/2016  09:48 5/9/2018 19:35 2/7/2019 06:54 2/8/2019 04:41 11/6/2019 22:55   Hemoglobin Latest Ref Range: 12.0 - 16.0 g/dL 10.0 (L) 11.3 (L) 11.4 (L) 10.8 (L) 10.8 (L)     VTE Risk Mitigation (From admission, onward)         Ordered     Place sequential compression device  Until discontinued      11/07/19 0406     IP VTE HIGH RISK PATIENT  Once      11/07/19 0406                   XAVIER Arredondo  Department of Hospital Medicine   Formerly [facility], later [facility]

## 2019-11-07 NOTE — ASSESSMENT & PLAN NOTE
Cardiac monitor for arrhythmia  Trend troponin  Had a negative stress test 2/2019  Consult Cardiology  NPO for possible cardiac workup  Aspirin and nitroglycerin

## 2019-11-07 NOTE — PLAN OF CARE
11/07/19 1625   Discharge Reassessment   Assessment Type Discharge Planning Reassessment     SW received consult for information on how to apply for [place] Medicaid. MARYCHUY left a message for Ulises at [phone] to see if an  can visit pt to provide this information.

## 2019-11-08 NOTE — HOSPITAL COURSE
Patient presented with chest pain.  Serial Darryn negative.  Patient had stress test 2/2019 which was negative for ischemia.  Long discussion on patient with need to establish care as she does not have a PCP and is coming to the ED for symptoms and then never gets any answers as we rule out the acute, life threatening issues and then discharge her home.  She voiced understanding.  Patient is uninsured and we discussed need to apply for [place] medicaid, etc.  She has a known abdominal hernia which could be the etiology of her symptoms. GI etiology is also a possibility.  Cardiology was consulted.  Patient ultimately decided she would prefer outpatient evaluation given testing was normal as she wanted to go home- I did think this was reasonable and thus she was discharged home with return precautions.

## 2019-11-08 NOTE — DISCHARGE SUMMARY
"Formerly [facility] Medicine  Discharge Summary      Patient Name: Alexsander Powers  MRN: [id number]  Admission Date: 11/6/2019  Hospital Length of Stay: 0 days  Discharge Date and Time: 11/7/2019  6:18 PM  Attending Physician: Ann att. providers found   Discharging Provider: Velia Irwin MD  Primary Care Provider: Primary Doctor Ann      HPI:   50 yo woman with PMH HTN, Bipolar Disorder, and Thallasemia presented to the ED with chest pain.    Location: Mid chest  Quality:  Pressure/tightness, "someone sitting on my chest"  Radiation:  To back  Onset:  Gradual  Aggravating factors: None  Alleviating factors: Aspirin and NTG  Timing: Intermittent/brief (few minutes)  for the past 2 weeks, sudden and severe 2 hrs PTA  Severity: Moderate - severe  Associated symptoms: SOB, "like running a marathon"  Co morbidities:  Hypertension - not taking any medication  Other factors:  Denies recreational drug used. Father had MI in his 40s.  Prior diagnostic studies: Negative stress test 2/2019    Currently, she denies pain or shortness of breath.  She denies fever, chills, cough, abdominal pain, urinary symptoms.  She reports 10 lb weight gain in the past 2 months.  She has left leg pain and swelling.    In the ED  Afebrile,  - 174  Labs:  Unremarkable  Troponin:  Normal  EKG, personally reviewed, sinus rhythm with no acute changes compare with EKG on 02/07/2019  CXR, personally reviewed, no obvious infiltrates or overt heart failure  CTA chest: No acute pathology is detected in the chest. No evidence of pulmonary thromboembolism.  US LEs; No DVT            * No surgery found *      Hospital Course:   Patient presented with chest pain.  Serial Darryn negative.  Patient had stress test 2/2019 which was negative for ischemia.  Long discussion on patient with need to establish care as she does not have a PCP and is coming to the ED for symptoms and then never gets any answers as we rule out the acute, life " threatening issues and then discharge her home.  She voiced understanding.  Patient is uninsured and we discussed need to apply for [place] medicaid, etc.  She has a known abdominal hernia which could be the etiology of her symptoms. GI etiology is also a possibility.  Cardiology was consulted.  Patient ultimately decided she would prefer outpatient evaluation given testing was normal as she wanted to go home- I did think this was reasonable and thus she was discharged home with return precautions.     Consults:     No new Assessment & Plan notes have been filed under this hospital service since the last note was generated.  Service: Hospital Medicine    Final Active Diagnoses:    Diagnosis Date Noted POA    Hypertension [I10] 11/07/2019 Yes    Microcytic anemia [D50.9] 06/14/2016 Yes      Problems Resolved During this Admission:    Diagnosis Date Noted Date Resolved POA    PRINCIPAL PROBLEM:  Chest pain [R07.9] 06/13/2016 11/07/2019 Yes       Discharged Condition: good    Disposition: Home or Self Care    Follow Up:  Follow-up Information     Primary Doctor No.               Patient Instructions:      Diet Adult Regular     Notify your health care provider if you experience any of the following:  temperature >100.4     Notify your health care provider if you experience any of the following:  persistent nausea and vomiting or diarrhea     Notify your health care provider if you experience any of the following:  severe uncontrolled pain     Notify your health care provider if you experience any of the following:  persistent dizziness, light-headedness, or visual disturbances     Activity as tolerated       Significant Diagnostic Studies: Labs:   CMP   Recent Labs   Lab 11/06/19  2255      K 3.5      CO2 22*   GLU 99   BUN 14   CREATININE 0.8   CALCIUM 9.0   PROT 7.2   ALBUMIN 3.8   BILITOT 0.5   ALKPHOS 61   AST 22   ALT 26   ANIONGAP 11   ESTGFRAFRICA >60.0   EGFRNONAA >60.0    and CBC   Recent Labs   Lab  11/06/19  2255   WBC 11.90   HGB 10.8*   HCT 35.4*          Pending Diagnostic Studies:     None         Medications:  Reconciled Home Medications:      Medication List      CONTINUE taking these medications    phentermine 37.5 mg tablet  Commonly known as:  ADIPEX-P  Take 37.5 mg by mouth once daily.            Indwelling Lines/Drains at time of discharge:   Lines/Drains/Airways     None                 Time spent on the discharge of patient: 35 minutes  Patient was seen and examined on the date of discharge and determined to be suitable for discharge.         Velia Irwin MD  Department of Hospital Medicine  On license of [facility]

## 2019-11-08 NOTE — PLAN OF CARE
11/08/19 0940   Final Note   Assessment Type Final Discharge Note   Anticipated Discharge Disposition Home     Pt has d/c, but [facility] will be calling her to speak with her about [place] Medicaid.

## 2020-03-20 ENCOUNTER — HOSPITAL ENCOUNTER (EMERGENCY)
Facility: HOSPITAL | Age: 50
Discharge: HOME | End: 2020-03-20
Attending: EMERGENCY MEDICINE

## 2020-03-20 VITALS
TEMPERATURE: 98 F | RESPIRATION RATE: 20 BRPM | HEART RATE: 111 BPM | WEIGHT: 198 LBS | OXYGEN SATURATION: 98 % | DIASTOLIC BLOOD PRESSURE: 120 MMHG | SYSTOLIC BLOOD PRESSURE: 188 MMHG

## 2020-03-20 DIAGNOSIS — M54.50 ACUTE BILATERAL LOW BACK PAIN WITHOUT SCIATICA: Primary | ICD-10-CM

## 2020-03-20 PROCEDURE — 63600175 PHARM REV CODE 636 W HCPCS: Performed by: EMERGENCY MEDICINE

## 2020-03-20 PROCEDURE — 99284 EMERGENCY DEPT VISIT MOD MDM: CPT | Mod: 25

## 2020-03-20 PROCEDURE — 96372 THER/PROPH/DIAG INJ SC/IM: CPT

## 2020-03-20 RX ORDER — HYDROCODONE BITARTRATE AND ACETAMINOPHEN 5; 325 MG/1; MG/1
1 TABLET ORAL EVERY 6 HOURS PRN
Qty: 12 TABLET | Refills: 0 | Status: SHIPPED | OUTPATIENT
Start: 2020-03-20 | End: 2020-08-27

## 2020-03-20 RX ORDER — MORPHINE SULFATE 10 MG/ML
9 INJECTION INTRAMUSCULAR; INTRAVENOUS; SUBCUTANEOUS
Status: COMPLETED | OUTPATIENT
Start: 2020-03-20 | End: 2020-03-20

## 2020-03-20 RX ADMIN — MORPHINE SULFATE 9 MG: 10 INJECTION, SOLUTION INTRAMUSCULAR; INTRAVENOUS at 12:03

## 2020-03-20 NOTE — ED PROVIDER NOTES
Encounter Date: 3/20/2020    SCRIBE #1 NOTE: I, Rositamarichuy Levine, am scribing for, and in the presence of, Roderick Killian MD.       History     Chief Complaint   Patient presents with    Back Pain     x 5 days       Time seen by provider: 12:27 PM on 03/20/2020    Alexsander Poewrs is a 50 y.o. female who presents to the ED with an onset of worsening back pan. Pt c/o radiating lower back pain and numbness, as well as pain behind bilateral lower extremities. She states she has been experiencing chronic back pain, but pain began worsening 4 days ago shortly after bending over bathtub, believing she possibly strained a muscle. Pain worsens with movement, finding it difficult to walk. She has taken Robaxin, Flexeril,  Gabapentin, and ibuprofen 800mg with little to no relief. The patient denies N/V, fever, abdominal pain, rash, blood in urine or stool, or any other symptoms at this time. PMHx of scoliosis, sciatica, and right-sided tarsal tunnel syndrome. PSHx of back surgery x2. Denies Hx of IV drug use.     The history is provided by the patient.     Review of patient's allergies indicates:   Allergen Reactions    Phenergan [promethazine]      No past medical history on file.  No past surgical history on file.  No family history on file.  Social History     Tobacco Use    Smoking status: Not on file   Substance Use Topics    Alcohol use: Not on file    Drug use: Not on file     Review of Systems   Constitutional: Negative for fever.   HENT: Negative for sore throat.    Respiratory: Negative for shortness of breath.    Cardiovascular: Negative for chest pain.   Gastrointestinal: Negative for abdominal pain, blood in stool, nausea and vomiting.   Genitourinary: Negative for dysuria and hematuria.   Musculoskeletal: Positive for back pain and gait problem.   Skin: Negative for rash.   Neurological: Positive for numbness (lower back). Negative for weakness.   Hematological: Does not bruise/bleed easily.        Physical Exam     Initial Vitals   BP Pulse Resp Temp SpO2   03/20/20 1215 03/20/20 1215 03/20/20 1214 03/20/20 1214 03/20/20 1214   (!) 188/120 (!) 111 20 98.2 °F (36.8 °C) 98 %      MAP       --                Physical Exam    Nursing note and vitals reviewed.  Constitutional: She appears well-developed and well-nourished. She is not diaphoretic.   Mildly uncomfortable secondary to pain.   HENT:   Head: Normocephalic and atraumatic.   Mouth/Throat: Oropharynx is clear and moist.   Eyes: Conjunctivae are normal.   Neck: Neck supple.   Cardiovascular: Normal rate, regular rhythm, normal heart sounds and intact distal pulses. Exam reveals no gallop and no friction rub.    No murmur heard.  Pulmonary/Chest: Breath sounds normal. She has no wheezes. She has no rhonchi. She has no rales.   Abdominal: Soft. She exhibits no distension. There is no tenderness.   Musculoskeletal: Normal range of motion.        Thoracic back: She exhibits no tenderness.        Lumbar back: She exhibits tenderness.   Diffuse lumbar spine tenderness. Well-healed lumbar incision without erythema. No thoracic spine tenderness.   Neurological: She is alert and oriented to person, place, and time. She has normal strength. No sensory deficit.   5/5 strength and sensation in distal lower extremities.   Skin: No rash noted. No erythema.   Psychiatric: Her speech is normal.         ED Course   Procedures  Labs Reviewed - No data to display       Imaging Results    None          Medical Decision Making:   History:   Old Medical Records: I decided to obtain old medical records.            Scribe Attestation:   Scribe #1: I performed the above scribed service and the documentation accurately describes the services I performed. I attest to the accuracy of the note.    I, Dr. Roderick Killian, personally performed the services described in this documentation. All medical record entries made by the scribe were at my direction and in my presence.  I  have reviewed the chart and agree that the record reflects my personal performance and is accurate and complete. Roderick Killian MD.  8:16 PM 03/20/2020    Alexsander Powers is a 50 y.o. female presenting with back pain.  Intramuscular injection given for pain here at patient's request. The patient has a nonfocal neurological examination with no red flags.  I doubt acute spinal cord processes requiring further spinal imaging such as CT or MRI.  I doubt epidural abscesses, severe deep space infection, transverse myelitis, discitis, cauda equina syndrome, or other emergent conditions.  Analgesia as necessary prescribed for home to be used with NSAIDs as necessary.  Follow up with PCP and pain medicine.  Detailed, specific return precautions reviewed.                      Clinical Impression:       ICD-10-CM ICD-9-CM   1. Acute bilateral low back pain without sciatica M54.5 724.2     338.19         Disposition:   Disposition: Discharged  Condition: Stable     ED Disposition Condition    Discharge Stable        ED Prescriptions     Medication Sig Dispense Start Date End Date Auth. Provider    HYDROcodone-acetaminophen (NORCO) 5-325 mg per tablet Take 1 tablet by mouth every 6 (six) hours as needed. 12 tablet 3/20/2020  Roderick Killian MD        Follow-up Information     Follow up With Specialties Details Why Contact Info    PCP, next week, see list        Shane Mcgee MD Pain Medicine  Pain medicine, 1-2 weeks [address] [place] [address] [address]  [facility] [address]  [phone]                                       Roderick Killian MD  03/20/20 2017

## 2020-03-20 NOTE — ED NOTES
To room 6 with complaints of back pain for the past 4 days denies trauma states has tried flexeril neurontin and robaxin over last few days with no relief NAD noted

## 2020-03-20 NOTE — DISCHARGE INSTRUCTIONS
[facility]    [facility] [phone]    - Dr. Louie  - Dr. Gee  - Dr. Mae  - Dr. Olson  - Dr. Simons  - Dr. Noriega  - Dr. King  - Dr. Grant ([place])    [facility] [phone]    - Dr. Farfan  - Dr. Torres  - Dr. Rosales  - Dr. Alves    [facility]    - Dr. Jimenez [phone]  - Dr. Ochoa [phone]  - Dr. Mcpherson [phone]    Dr. Michelle [phone]  Dr. Romero [phone]  Dr. Vences [phone]  Dr. Jimenez [phone]  Dr. Beach [phone]  Dr. Tillman [phone]  Dr. Santamaria [phone]  Dr. Schmidt [phone]  Dr. House [phone]    [facility]    [facility] [phone]  Dr. Adams [phone]  Dr. Cullen [phone]  Dr. Finney [phone]  Dr. Soares [phone]  Dr. Conley [phone]  Dr. Shanks [phone]          [facility] [place]  - [address] [place] [address]  - [phone]    [facility]  - [address] [place] [address]  - [phone]    [facility]  - [address], [address], [place]  - [phone]    Critical access hospital  - [address] [place] [address]  - [phone]    [facility]  - [address], [place] [address]   [phone]    [facility]  - [address] [place] [address] - [phone]    [facility]  - [address] [place] [address]  - [phone]

## 2020-08-27 ENCOUNTER — OFFICE VISIT (OUTPATIENT)
Dept: CARDIOLOGY | Facility: CLINIC | Age: 50
End: 2020-08-27
Payer: COMMERCIAL

## 2020-08-27 VITALS
DIASTOLIC BLOOD PRESSURE: 78 MMHG | HEIGHT: 62 IN | WEIGHT: 192.44 LBS | SYSTOLIC BLOOD PRESSURE: 138 MMHG | BODY MASS INDEX: 35.41 KG/M2 | HEART RATE: 93 BPM

## 2020-08-27 DIAGNOSIS — R07.9 CHEST PAIN, UNSPECIFIED TYPE: Primary | ICD-10-CM

## 2020-08-27 DIAGNOSIS — I10 BENIGN ESSENTIAL HTN: ICD-10-CM

## 2020-08-27 DIAGNOSIS — R79.89 ELEVATED TROPONIN: ICD-10-CM

## 2020-08-27 PROCEDURE — 93000 ELECTROCARDIOGRAM COMPLETE: CPT | Mod: S$GLB,,, | Performed by: INTERNAL MEDICINE

## 2020-08-27 PROCEDURE — 99203 PR OFFICE/OUTPT VISIT, NEW, LEVL III, 30-44 MIN: ICD-10-PCS | Mod: 25,S$GLB,, | Performed by: INTERNAL MEDICINE

## 2020-08-27 PROCEDURE — 99203 OFFICE O/P NEW LOW 30 MIN: CPT | Mod: 25,S$GLB,, | Performed by: INTERNAL MEDICINE

## 2020-08-27 PROCEDURE — 3008F BODY MASS INDEX DOCD: CPT | Mod: CPTII,S$GLB,, | Performed by: INTERNAL MEDICINE

## 2020-08-27 PROCEDURE — 99999 PR PBB SHADOW E&M-EST. PATIENT-LVL III: ICD-10-PCS | Mod: PBBFAC,,, | Performed by: INTERNAL MEDICINE

## 2020-08-27 PROCEDURE — 93000 EKG 12-LEAD: ICD-10-PCS | Mod: S$GLB,,, | Performed by: INTERNAL MEDICINE

## 2020-08-27 PROCEDURE — 99999 PR PBB SHADOW E&M-EST. PATIENT-LVL III: CPT | Mod: PBBFAC,,, | Performed by: INTERNAL MEDICINE

## 2020-08-27 PROCEDURE — 3008F PR BODY MASS INDEX (BMI) DOCUMENTED: ICD-10-PCS | Mod: CPTII,S$GLB,, | Performed by: INTERNAL MEDICINE

## 2020-08-27 NOTE — PROGRESS NOTES
"  Primary Doctor No  @Cardiology@    Subjective:   Patient ID:  Alexsander Powers is a 50 y.o. female with pmhx s/f HTN, BPD, who presents for evaluation of Hypertension and Chest Pain (tightness)    Chest Pain:  Alexsander Powers complains of chest discomfort. Onset was 1 day ago. Symptoms have remained consistent since that time. The patient's pain is constant. The patient describes the pain as pressure and does not radiate. Patient rates pain as a 7/10 in intensity. Associated symptoms are: fevers (100.7 reportedly at home), malaise, pleurisy, body aches, chest pain, chest pressure/discomfort, dyspnea, fatigue, near-syncope and palpitations. Aggravating factors are: deep inspiration and walking. Alleviating factors are: none. Of note, patient stating that symptoms began yesterday and have remained since that time. The "7/10" discomfort in her chest has not changed in quality since that time and has remained constant. Patient's cardiac risk factors are: hypertension and obesity (BMI >= 30 kg/m2). Patient's risk factors for DVT/PE: none. Previous cardiac testing: electrocardiogram (ECG). Repeat ECG in clinic unchanged from her baseline, suggesting that this is not an anginal etiology.    She reports after onset of symptoms that she presented to another facility (urgent care?). She states that workup was unremarkable, but they suggested she go to ER for further evaluation, which she did not want to do.      Review of Systems   Constitution: Positive for chills, fever and malaise/fatigue. Negative for night sweats, weight gain and weight loss.   HENT: Negative for congestion, ear discharge and ear pain.    Eyes: Negative for blurred vision and discharge.   Cardiovascular: Positive for chest pain, dyspnea on exertion and near-syncope. Negative for claudication, cyanosis, irregular heartbeat and leg swelling.   Respiratory: Positive for shortness of breath. Negative for cough, hemoptysis, sleep disturbances due to " "breathing and snoring.    Endocrine: Negative for cold intolerance and heat intolerance.   Hematologic/Lymphatic: Negative for adenopathy and bleeding problem.   Musculoskeletal: Positive for back pain, joint pain and myalgias.       Past Medical History:   Diagnosis Date    Bipolar affective disorder     Encounter for blood transfusion     Scoliosis     Scoliosis        Past Surgical History:   Procedure Laterality Date    ABDOMINAL SURGERY      ANKLE FRACTURE SURGERY      APPENDECTOMY      BACK SURGERY      HYSTERECTOMY         Family History   Problem Relation Age of Onset    Cancer Mother     Heart disease Father     Hypertension Father     Diabetes Father          Current Outpatient Medications:     ascorbic acid (VITAMIN C ORAL), Take by mouth once daily., Disp: , Rfl:     GABAPENTIN ORAL, Take by mouth daily as needed., Disp: , Rfl:     multivitamin capsule, Take 1 capsule by mouth once daily., Disp: , Rfl:   Objective:   /78 (BP Location: Left arm, Patient Position: Sitting, BP Method: X-Large (Automatic))   Pulse 93   Ht 5' 1.5" (1.562 m)   Wt 87.3 kg (192 lb 7.4 oz)   BMI 35.78 kg/m²      Physical Exam   Constitutional: She is oriented to person, place, and time. She appears well-developed and well-nourished.   HENT:   Head: Normocephalic and atraumatic.   Right Ear: External ear normal.   Left Ear: External ear normal.   Eyes: Conjunctivae and EOM are normal.   Neck: Normal range of motion. Neck supple.   Cardiovascular: Normal rate, regular rhythm and intact distal pulses. Exam reveals no friction rub.   Murmur (midsystolic murmur) heard.  Pulses:       Radial pulses are 2+ on the right side and 2+ on the left side.        Femoral pulses are 2+ on the right side and 2+ on the left side.       Dorsalis pedis pulses are 2+ on the right side and 2+ on the left side.        Posterior tibial pulses are 2+ on the right side and 2+ on the left side.   Pulmonary/Chest: Effort normal and " breath sounds normal. No respiratory distress. She has no wheezes.   Abdominal: Soft. Bowel sounds are normal. She exhibits no distension. There is no abdominal tenderness.   Musculoskeletal: Normal range of motion.         General: No edema.   Neurological: She is alert and oriented to person, place, and time.   Skin: Skin is warm and dry.   Psychiatric: She has a normal mood and affect. Her behavior is normal.       Lab Results   Component Value Date     11/06/2019    K 3.5 11/06/2019     11/06/2019    CO2 22 (L) 11/06/2019    BUN 14 11/06/2019    CREATININE 0.8 11/06/2019    ANIONGAP 11 11/06/2019     Lab Results   Component Value Date    HGBA1C 5.5 06/13/2016     Lab Results   Component Value Date    BNP 26 11/06/2019    BNP 62 06/13/2016       Lab Results   Component Value Date    WBC 11.90 11/06/2019    HGB 10.8 (L) 11/06/2019    HCT 35.4 (L) 11/06/2019     11/06/2019    GRAN 7.0 11/06/2019    GRAN 58.4 11/06/2019     Lab Results   Component Value Date    CHOL 172 02/07/2019    HDL 50 02/07/2019    LDLCALC 84.2 02/07/2019    TRIG 189 (H) 02/07/2019        Assessment:     1. Chest pain, unspecified type    2. Elevated troponin    3. Benign essential HTN        Plan:     Chest pain on breathing  Given pleuritic quality and the fact that she reports symptoms have remained unchanged for 24 hours, this is unlikely to be anginal etiology  If it was angina, 24 hours of severe symptoms would have manifested as an infarct on ecg, which is unchanged from her baseline.  Pt has several complaints consistent with a viral process    Benign essential HTN  She reports elevated BP while symptoms were at peak; however, BP under control today in clinic.  Management per her PCP.          Patient seen and examined with staff, Dr. Amado, who agrees with management and plan.    Future Appointments   Date Time Provider Department Center   9/10/2020  4:00 PM Bry Buchanan MD [facility] CARDIO Moris Buchanan,  MD  Cardiovascular Disease Fellow PGY IV  Pager [phone]

## 2020-08-28 NOTE — ASSESSMENT & PLAN NOTE
No documented elevated troponin. I am unable to find any elevated troponin.  There can be a claim for pericarditis with symptoms; however, physical exam and ecg not supportive of that  Myocarditis my also be kept under consideration.  See chest pain

## 2020-08-28 NOTE — PROGRESS NOTES
I have personally taken the history and examined this patient and agree with the fellow's note as stated above. > patient had an isolated minimal elevation of troponin in 2016. Nothing to suggest myocarditis at this time. Suspect viral illness and despite negative screening test , will still need to keep Covid 19 in mind.Follow up with her PCP.

## 2020-08-28 NOTE — ASSESSMENT & PLAN NOTE
She reports elevated BP while symptoms were at peak; however, BP under control today in clinic.  Management per her PCP.

## 2020-08-28 NOTE — ASSESSMENT & PLAN NOTE
Given pleuritic quality and the fact that she reports symptoms have remained unchanged for 24 hours, this is unlikely to be anginal etiology  If it was angina, 24 hours of severe symptoms would have manifested as an infarct on ecg, which is unchanged from her baseline.  Pt has several complaints consistent with a viral process

## 2020-10-16 ENCOUNTER — OFFICE VISIT (OUTPATIENT)
Dept: FAMILY MEDICINE | Facility: CLINIC | Age: 50
End: 2020-10-16
Payer: COMMERCIAL

## 2020-10-16 VITALS
TEMPERATURE: 98 F | OXYGEN SATURATION: 97 % | HEIGHT: 62 IN | BODY MASS INDEX: 36.61 KG/M2 | DIASTOLIC BLOOD PRESSURE: 95 MMHG | SYSTOLIC BLOOD PRESSURE: 145 MMHG | WEIGHT: 198.94 LBS | HEART RATE: 100 BPM

## 2020-10-16 DIAGNOSIS — R00.2 PALPITATIONS: ICD-10-CM

## 2020-10-16 DIAGNOSIS — R01.1 SYSTOLIC MURMUR: ICD-10-CM

## 2020-10-16 DIAGNOSIS — I10 ESSENTIAL HYPERTENSION: Primary | ICD-10-CM

## 2020-10-16 DIAGNOSIS — R00.0 SYMPTOMATIC TACHYCARDIA: ICD-10-CM

## 2020-10-16 DIAGNOSIS — R60.0 BILATERAL LEG EDEMA: ICD-10-CM

## 2020-10-16 DIAGNOSIS — E66.9 CLASS 2 OBESITY WITH BODY MASS INDEX (BMI) OF 36.0 TO 36.9 IN ADULT, UNSPECIFIED OBESITY TYPE, UNSPECIFIED WHETHER SERIOUS COMORBIDITY PRESENT: ICD-10-CM

## 2020-10-16 DIAGNOSIS — D56.3 BETA THALASSEMIA MINOR: ICD-10-CM

## 2020-10-16 PROCEDURE — 3008F BODY MASS INDEX DOCD: CPT | Mod: S$GLB,,, | Performed by: FAMILY MEDICINE

## 2020-10-16 PROCEDURE — 99204 PR OFFICE/OUTPT VISIT, NEW, LEVL IV, 45-59 MIN: ICD-10-PCS | Mod: S$GLB,,, | Performed by: FAMILY MEDICINE

## 2020-10-16 PROCEDURE — 99204 OFFICE O/P NEW MOD 45 MIN: CPT | Mod: S$GLB,,, | Performed by: FAMILY MEDICINE

## 2020-10-16 PROCEDURE — 3008F PR BODY MASS INDEX (BMI) DOCUMENTED: ICD-10-PCS | Mod: S$GLB,,, | Performed by: FAMILY MEDICINE

## 2020-10-16 RX ORDER — MELOXICAM 15 MG/1
15 TABLET ORAL DAILY
COMMUNITY
Start: 2020-09-02 | End: 2020-10-16

## 2020-10-16 RX ORDER — ATENOLOL 25 MG/1
25 TABLET ORAL DAILY
Qty: 30 TABLET | Refills: 1 | Status: SHIPPED | OUTPATIENT
Start: 2020-10-16 | End: 2020-12-15

## 2020-10-16 RX ORDER — CLONAZEPAM 1 MG/1
1 TABLET ORAL
COMMUNITY
End: 2020-10-16

## 2020-10-16 NOTE — ASSESSMENT & PLAN NOTE
Blood pressure elevated today. It has been in the past. She has never been on routine medication. Though beta blockers are not first line management for blood pressure, with concern for tachycardia and anxiety and known migraines- will try a low dose beta blocker. If TSH is normal- will likely transition to better first line medication. EKG normal today.

## 2020-10-16 NOTE — PATIENT INSTRUCTIONS
Thank you for allowing me to participate in your care today. It is an honor to be a part of your healthcare team at [facility]. If you had labs ordered today, you will receive notification via [employer], phone call or mailed letter regarding your results within 7 days. If you have any questions or concerns regarding your visit today, please do not hesitate to contact us.  Sincerely,   Kathy Cruz M.D.    Atenolol tablets  What is this medicine?  ATENOLOL (a TEN oh lole) is a beta-blocker. Beta-blockers reduce the workload on the heart and help it to beat more regularly. This medicine is used to treat high blood pressure and to prevent chest pain. It is also used to protect the heart during a heart attack and to prevent an additional heart attack from occurring.  How should I use this medicine?  Take this medicine by mouth with a drink of water. Follow the directions on the prescription label. This medicine may be taken with or without food. Take your medicine at regular intervals. Do not take more medicine than directed. Do not stop taking this medicine suddenly. This could lead to serious heart-related effects.  Talk to your pediatrician regarding the use of this medicine in children. Special care may be needed.  What side effects may I notice from receiving this medicine?  Side effects that you should report to your doctor or health care professional as soon as possible:  · allergic reactions like skin rash, itching or hives, swelling of the face, lips, or tongue  · breathing problems  · changes in vision  · chest pain  · cold, tingling, or numb hands or feet  · depression  · fast, irregular heartbeat  · feeling faint or lightheaded, falls  · fever with sore throat  · rapid weight gain  · swollen ankles, legs  Side effects that usually do not require medical attention (report to your doctor or health care professional if they continue or are bothersome):  · anxiety, nervous  · diarrhea  · dry skin  · change in sex  drive or performance  · headache  · nightmares or trouble sleeping  · short term memory loss  · stomach upset  · unusually tired  What may interact with this medicine?  This medicine may interact with the following medications:  · certain medicines for blood pressure, heart disease, irregular heart beat  · clonidine  · digoxin  · diuretics  · dobutamine  · epinephrine  · isoproterenol  · NSAIDs, medicines for pain and inflammation, like ibuprofen or naproxen  · reserpine  What if I miss a dose?  If you miss a dose, take it as soon as you can. If it is almost time for your next dose, take only that dose. Do not take double or extra doses.  Where should I keep my medicine?  Keep out of the reach of children.  Store at room temperature between 20 and 25 degrees C (68 and 77 degrees F). Close tightly and protect from light. Throw away any unused medicine after the expiration date.  What should I tell my health care provider before I take this medicine?  They need to know if you have any of these conditions:  · diabetes  · heart or vessel disease like slow heart rate, worsening heart failure, heart block, sick sinus syndrome or Raynaud's disease  · kidney disease  · lung or breathing disease, like asthma or emphysema  · pheochromocytoma  · thyroid disease  · an unusual or allergic reaction to atenolol, other beta-blockers, medicines, foods, dyes, or preservatives  · pregnant or trying to get pregnant  · breast-feeding  What should I watch for while using this medicine?  Visit your doctor or health care professional for regular check ups. Check your blood pressure and pulse rate regularly. Ask your health care professional what your blood pressure and pulse rate should be, and when you should contact him or her.  You may get drowsy or dizzy. Do not drive, use machinery, or do anything that needs mental alertness until you know how this medicine affects you. Do not stand or sit up quickly. Alcohol may interfere with the  effect of this medicine. Avoid alcoholic drinks.  This medicine can affect blood sugar levels. If you have diabetes, check with your doctor or health care professional before you change your diet or the dose of your diabetic medicine.  Do not treat yourself for coughs, colds, or pain while you are taking this medicine without asking your doctor or health care professional for advice. Some ingredients may increase your blood pressure.  NOTE:This sheet is a summary. It may not cover all possible information. If you have questions about this medicine, talk to your doctor, pharmacist, or health care provider. Copyright© 2017 [employer]

## 2020-10-16 NOTE — PROGRESS NOTES
Subjective:       Patient ID: Alexsander Powers is a 50 y.o. female.    Chief Complaint: Hypertension (x 1-2 months. ), Edema (left leg, +2 pitted edema .), Ear Fullness (was dx with vertigo hospital. ), and Tachycardia (x 1 month, at rest pt feels like she has been running. Normal EKG in ER... )    Ms. Powers is a very kind 50 year old female who presents today with multiple complaints. She is understandably frustrated and tired. She has had a long journey of symptoms with no clear answers and no clear diagnosis. She tells me that her biggest complaints are that her blood pressure gets high sometimes and her heart races. She has occasionally had associated chest pain but usually she will have tingling in her hands and feet. She has some recurrent off and on swelling in her lower extremities. She recently with diet (meal replacement shakes) has lost 20 lbs intentionally. She has a strong family history of cardiovascular disease.     Edema Left leg comes and goes. Worse when BP is up    Tachycardia: Heart races often. She tells me that sometimes she feels like she is going to pass out walking around the bedroom. And sometimes she can walk forever with no problsme.     Vertigo: 2 days of symptoms. Was diagnosed the night before last in the ED. Fluid on the right ear    Known anemia: She has Beta thalassemia minor trait. No signs or symptoms of bleeding. States that she always has anemia. One child with beta thalassemia major.     Right foot tarsal tunnel: Was supposed to be on gabapentin has not been on it for a couple of months. She does not love taking medications.     Palpitations   This is a recurrent problem. The current episode started more than 1 year ago. The problem occurs daily. The problem has been gradually worsening. The symptoms are aggravated by alcohol and stress. Associated symptoms include anxiety (particularly around health), chest fullness, chest pain, dizziness, an irregular heartbeat, numbness  (tingling in toes and fingers) and shortness of breath. Associated symptoms comments: Sometimes occurs with pains and sometimes nothing at all. Treatments tried: blood pressure medications. Medication made her pass out so she got rid of it. The treatment provided no relief. Risk factors include family history and obesity. Her past medical history is significant for anemia. There is no history of drug use or hyperthyroidism. small heart murmur   Hypertension  This is a recurrent problem. The current episode started more than 1 year ago. The problem has been gradually worsening (At times she is told she does not have high blood pressure and at other times she has been told she did) since onset. The problem is uncontrolled. Associated symptoms include chest pain, palpitations and shortness of breath. There are no associated agents to hypertension. Risk factors for coronary artery disease include obesity and post-menopausal state. There are no compliance problems.      Review of Systems   Constitutional: Positive for fatigue. Negative for activity change, appetite change and unexpected weight change (intentional 20 lb weight loss).   HENT: Positive for hearing loss (chronic decreased hearing in right ear).    Eyes: Negative.    Respiratory: Positive for shortness of breath.    Cardiovascular: Positive for chest pain and palpitations.   Gastrointestinal: Negative.    Endocrine: Negative for cold intolerance, heat intolerance, polydipsia, polyphagia and polyuria.   Genitourinary: Negative for bladder incontinence and dysuria.   Musculoskeletal: Positive for arthralgias (right foot pain).   Integumentary:  Negative.   Allergic/Immunologic: Negative for immunocompromised state.   Neurological: Positive for dizziness and numbness (tingling in toes and fingers).   Hematological: Negative for adenopathy.   Psychiatric/Behavioral: Negative for confusion. The patient is nervous/anxious (particularly around health).           Objective:      Physical Exam  Vitals signs and nursing note reviewed.   Constitutional:       General: She is not in acute distress.     Appearance: She is well-developed.   HENT:      Head: Normocephalic and atraumatic.   Eyes:      Conjunctiva/sclera: Conjunctivae normal.      Pupils: Pupils are equal, round, and reactive to light.   Neck:      Musculoskeletal: Normal range of motion and neck supple.      Thyroid: No thyromegaly.      Vascular: No carotid bruit.   Cardiovascular:      Rate and Rhythm: Regular rhythm. Tachycardia present.      Heart sounds: Murmur (soft systolic murmur) present.   Pulmonary:      Effort: Pulmonary effort is normal. No respiratory distress.      Breath sounds: Normal breath sounds.   Abdominal:      General: Bowel sounds are normal. There is no distension.      Palpations: Abdomen is soft. There is no mass.   Musculoskeletal: Normal range of motion.      Right lower le+ Edema present.      Left lower le+ Edema present.   Skin:     General: Skin is warm and dry.      Capillary Refill: Capillary refill takes less than 2 seconds.      Findings: No rash.   Neurological:      Mental Status: She is alert and oriented to person, place, and time.      Cranial Nerves: No cranial nerve deficit.   Psychiatric:         Thought Content: Thought content normal.         Assessment:       1. Essential hypertension    2. Class 2 obesity with body mass index (BMI) of 36.0 to 36.9 in adult, unspecified obesity type, unspecified whether serious comorbidity present    3. Symptomatic tachycardia    4. Beta thalassemia minor    5. Systolic murmur    6. Palpitations    7. Bilateral leg edema        Plan:       Problem List Items Addressed This Visit        Cardiac/Vascular    Systolic murmur     Present today on exam. Low grade systolic murmur.          Hypertension - Primary     Blood pressure elevated today. It has been in the past. She has never been on routine medication. Though beta  blockers are not first line management for blood pressure, with concern for tachycardia and anxiety and known migraines- will try a low dose beta blocker. If TSH is normal- will likely transition to better first line medication. EKG normal today.          Relevant Orders    TSH       Oncology    Beta thalassemia minor     Patient with notable anemia. States she has had this as a child but she has had to be on supplementation many times in the past. Her symptoms could potentially be consistent with a symptomatic anemia but her numbers appear relatively stable. Has not been on folate nor iron supplementation.   Lab Results   Component Value Date    WBC 11.90 11/06/2019    HGB 10.8 (L) 11/06/2019    HCT 35.4 (L) 11/06/2019    MCV 62 (L) 11/06/2019     11/06/2019                Relevant Medications    prenat.vits,eduar,min-iron-folic (PRENATAL VITAMIN) Tab       Endocrine    Obesity     Continue meal replacement shakes. Great job with weight loss.            Other Visit Diagnoses     Symptomatic tachycardia        Adding a beta blocker    Relevant Medications    atenoloL (TENORMIN) 25 MG tablet    Other Relevant Orders    TSH    EKG 12-lead    Palpitations        Rate 82 at time of EKG. Reviewed by me and noted to be in NSR with no concerning ST segment changes. Normal QT. Normal axis.     Bilateral leg edema         Pitting. Left often greater than right. Mild today. Start work up. Close follow up in 2 weeks. Advised on compression stockings, elevation. May consider diuretic for BP management.      Overall patient with a lot going on and a prolonged medical course of a lot of unexplained symptoms. Will start a work up and progress from there with a close follow up in 2 weeks. Reassurance provided.      New patient with > 4 new problems with additional work up. Chart reviewed and summarized. EKG ordered and interpreted. Rx drug management. Moderate MDM

## 2020-10-16 NOTE — ASSESSMENT & PLAN NOTE
Patient with notable anemia. States she has had this as a child but she has had to be on supplementation many times in the past. Her symptoms could potentially be consistent with a symptomatic anemia but her numbers appear relatively stable. Has not been on folate nor iron supplementation.   Lab Results   Component Value Date    WBC 11.90 11/06/2019    HGB 10.8 (L) 11/06/2019    HCT 35.4 (L) 11/06/2019    MCV 62 (L) 11/06/2019     11/06/2019

## 2020-10-26 DIAGNOSIS — Z12.31 OTHER SCREENING MAMMOGRAM: ICD-10-CM

## 2020-10-30 ENCOUNTER — OFFICE VISIT (OUTPATIENT)
Dept: FAMILY MEDICINE | Facility: CLINIC | Age: 50
End: 2020-10-30
Payer: COMMERCIAL

## 2020-10-30 VITALS
DIASTOLIC BLOOD PRESSURE: 82 MMHG | WEIGHT: 200.63 LBS | HEIGHT: 62 IN | TEMPERATURE: 99 F | BODY MASS INDEX: 36.92 KG/M2 | SYSTOLIC BLOOD PRESSURE: 131 MMHG | HEART RATE: 85 BPM | OXYGEN SATURATION: 97 %

## 2020-10-30 DIAGNOSIS — R06.83 SNORING: Primary | ICD-10-CM

## 2020-10-30 DIAGNOSIS — G47.19 EXCESSIVE DAYTIME SLEEPINESS: ICD-10-CM

## 2020-10-30 PROCEDURE — 99213 OFFICE O/P EST LOW 20 MIN: CPT | Mod: S$GLB,,, | Performed by: FAMILY MEDICINE

## 2020-10-30 PROCEDURE — 3008F PR BODY MASS INDEX (BMI) DOCUMENTED: ICD-10-PCS | Mod: S$GLB,,, | Performed by: FAMILY MEDICINE

## 2020-10-30 PROCEDURE — 3008F BODY MASS INDEX DOCD: CPT | Mod: S$GLB,,, | Performed by: FAMILY MEDICINE

## 2020-10-30 PROCEDURE — 99213 PR OFFICE/OUTPT VISIT, EST, LEVL III, 20-29 MIN: ICD-10-PCS | Mod: S$GLB,,, | Performed by: FAMILY MEDICINE

## 2020-10-30 RX ORDER — MELOXICAM 15 MG/1
TABLET ORAL
COMMUNITY
Start: 2020-10-21 | End: 2021-04-29 | Stop reason: CLARIF

## 2020-10-30 NOTE — PATIENT INSTRUCTIONS
Thank you for allowing me to participate in your care today. It is an honor to be a part of your healthcare team at [facility]. If you had labs ordered today, you will receive notification via [employer], phone call or mailed letter regarding your results within 7 days. If you have any questions or concerns regarding your visit today, please do not hesitate to contact us.  Sincerely,   Kathy Cruz M.D.

## 2020-10-30 NOTE — PROGRESS NOTES
Subjective:       Patient ID: Alexsander Powers is a 50 y.o. female.    Chief Complaint: Fatigue    Ms. Powers presents today for follow up of her fatigue. She has been unable to get her labwork but she will get it sometime today. Awaiting a TSH. She tells me that she has had significant stress but with insurance she feels like she can finally get in to see doctors.     She is complaining of some persistent excessive daytime sleepiness. She snores per her . She tells me that in addition to all of that during a surgery at one point she was told that she may have sleep apnea.     She has never been worked up but certainly is an at risk patient.     Lastly she has not started taking her beta blocker nor her mobic. She is otherwise doing well and may decided to take them but she was worried about having side effects. She has cut back on her caffeine.       Review of Systems   Constitutional: Positive for fatigue. Negative for activity change and unexpected weight change.   Respiratory: Negative for shortness of breath.    Cardiovascular: Positive for palpitations. Negative for chest pain.   Gastrointestinal: Negative for abdominal pain.   Endocrine: Negative for polydipsia, polyphagia and polyuria.         Objective:      Physical Exam  Vitals signs reviewed.   Constitutional:       Appearance: Normal appearance.   Eyes:      Pupils: Pupils are equal, round, and reactive to light.   Pulmonary:      Effort: Pulmonary effort is normal. No respiratory distress.   Skin:     General: Skin is warm and dry.   Neurological:      Mental Status: She is alert and oriented to person, place, and time.   Psychiatric:         Mood and Affect: Mood normal.         Behavior: Behavior normal.         Thought Content: Thought content normal.         Judgment: Judgment normal.         Assessment:       No diagnosis found.    Plan:       Problem List Items Addressed This Visit     None      Visit Diagnoses     Snoring    -   Primary    Sleep referral.     Excessive daytime sleepiness

## 2020-11-17 ENCOUNTER — TELEPHONE (OUTPATIENT)
Dept: FAMILY MEDICINE | Facility: CLINIC | Age: 50
End: 2020-11-17

## 2020-12-14 ENCOUNTER — TELEPHONE (OUTPATIENT)
Dept: FAMILY MEDICINE | Facility: CLINIC | Age: 50
End: 2020-12-14

## 2020-12-14 NOTE — TELEPHONE ENCOUNTER
----- Message from Caroline Lucero MA sent at 12/14/2020  3:59 PM CST -----  Contact: [phone] (M)  Would like to get medical advice.Covid orders  Symptoms (please be specific):  fever times 2 days and sore throat and it hurts to breath.  Pt also has been exposure to someone who tested positive for Covid.  How long has patient had these symptoms:  this is day 5 with symptoms   Pharmacy name and phone # (copy from chart):    Comments:

## 2020-12-15 ENCOUNTER — TELEPHONE (OUTPATIENT)
Dept: FAMILY MEDICINE | Facility: CLINIC | Age: 50
End: 2020-12-15

## 2020-12-15 DIAGNOSIS — R00.0 SYMPTOMATIC TACHYCARDIA: ICD-10-CM

## 2020-12-15 RX ORDER — ATENOLOL 25 MG/1
TABLET ORAL
Qty: 30 TABLET | Refills: 1 | Status: SHIPPED | OUTPATIENT
Start: 2020-12-15 | End: 2021-04-29 | Stop reason: CLARIF

## 2020-12-16 NOTE — TELEPHONE ENCOUNTER
Called and discussed positive COVID status with patient. She is doing okay and feels like she has had some heavy breathing but is not in distress. She will call if she has questions or concerns. Gave warning signs and symptoms to seek emergent care.

## 2021-04-29 ENCOUNTER — HOSPITAL ENCOUNTER (EMERGENCY)
Facility: HOSPITAL | Age: 51
Discharge: HOME | End: 2021-04-29
Attending: EMERGENCY MEDICINE

## 2021-04-29 VITALS
WEIGHT: 198 LBS | HEIGHT: 62 IN | DIASTOLIC BLOOD PRESSURE: 72 MMHG | BODY MASS INDEX: 36.44 KG/M2 | TEMPERATURE: 98 F | HEART RATE: 98 BPM | SYSTOLIC BLOOD PRESSURE: 151 MMHG | OXYGEN SATURATION: 100 % | RESPIRATION RATE: 18 BRPM

## 2021-04-29 DIAGNOSIS — N63.0 BREAST SWELLING: ICD-10-CM

## 2021-04-29 DIAGNOSIS — R50.9 SUBJECTIVE FEVER: ICD-10-CM

## 2021-04-29 DIAGNOSIS — R79.89 ELEVATED LIVER FUNCTION TESTS: ICD-10-CM

## 2021-04-29 DIAGNOSIS — R59.0 AXILLARY LYMPHADENOPATHY: Primary | ICD-10-CM

## 2021-04-29 LAB
ALBUMIN SERPL BCP-MCNC: 3.7 G/DL (ref 3.5–5.2)
ALP SERPL-CCNC: 140 U/L (ref 55–135)
ALT SERPL W/O P-5'-P-CCNC: 77 U/L (ref 10–44)
ANION GAP SERPL CALC-SCNC: 11 MMOL/L (ref 8–16)
AST SERPL-CCNC: 57 U/L (ref 10–40)
BASOPHILS # BLD AUTO: 0.05 K/UL (ref 0–0.2)
BASOPHILS NFR BLD: 0.5 % (ref 0–1.9)
BILIRUB SERPL-MCNC: 0.7 MG/DL (ref 0.1–1)
BUN SERPL-MCNC: 7 MG/DL (ref 6–20)
CALCIUM SERPL-MCNC: 9.6 MG/DL (ref 8.7–10.5)
CHLORIDE SERPL-SCNC: 102 MMOL/L (ref 95–110)
CO2 SERPL-SCNC: 25 MMOL/L (ref 23–29)
CREAT SERPL-MCNC: 0.8 MG/DL (ref 0.5–1.4)
DIFFERENTIAL METHOD: ABNORMAL
EOSINOPHIL # BLD AUTO: 1.1 K/UL (ref 0–0.5)
EOSINOPHIL NFR BLD: 11.3 % (ref 0–8)
ERYTHROCYTE [DISTWIDTH] IN BLOOD BY AUTOMATED COUNT: 15.7 % (ref 11.5–14.5)
EST. GFR  (AFRICAN AMERICAN): >60 ML/MIN/1.73 M^2
EST. GFR  (NON AFRICAN AMERICAN): >60 ML/MIN/1.73 M^2
GLUCOSE SERPL-MCNC: 102 MG/DL (ref 70–110)
HCT VFR BLD AUTO: 35.1 % (ref 37–48.5)
HGB BLD-MCNC: 10.6 G/DL (ref 12–16)
IMM GRANULOCYTES # BLD AUTO: 0.04 K/UL (ref 0–0.04)
IMM GRANULOCYTES NFR BLD AUTO: 0.4 % (ref 0–0.5)
LIPASE SERPL-CCNC: 19 U/L (ref 4–60)
LYMPHOCYTES # BLD AUTO: 1.9 K/UL (ref 1–4.8)
LYMPHOCYTES NFR BLD: 19.4 % (ref 18–48)
MCH RBC QN AUTO: 19 PG (ref 27–31)
MCHC RBC AUTO-ENTMCNC: 30.2 G/DL (ref 32–36)
MCV RBC AUTO: 63 FL (ref 82–98)
MONOCYTES # BLD AUTO: 0.8 K/UL (ref 0.3–1)
MONOCYTES NFR BLD: 8.6 % (ref 4–15)
NEUTROPHILS # BLD AUTO: 5.7 K/UL (ref 1.8–7.7)
NEUTROPHILS NFR BLD: 59.8 % (ref 38–73)
NRBC BLD-RTO: 0 /100 WBC
PLATELET # BLD AUTO: 242 K/UL (ref 150–450)
PMV BLD AUTO: ABNORMAL FL (ref 9.2–12.9)
POTASSIUM SERPL-SCNC: 3.3 MMOL/L (ref 3.5–5.1)
PROT SERPL-MCNC: 8 G/DL (ref 6–8.4)
RBC # BLD AUTO: 5.59 M/UL (ref 4–5.4)
SODIUM SERPL-SCNC: 138 MMOL/L (ref 136–145)
WBC # BLD AUTO: 9.54 K/UL (ref 3.9–12.7)

## 2021-04-29 PROCEDURE — 36415 COLL VENOUS BLD VENIPUNCTURE: CPT | Performed by: EMERGENCY MEDICINE

## 2021-04-29 PROCEDURE — 83690 ASSAY OF LIPASE: CPT | Performed by: EMERGENCY MEDICINE

## 2021-04-29 PROCEDURE — 85025 COMPLETE CBC W/AUTO DIFF WBC: CPT | Performed by: EMERGENCY MEDICINE

## 2021-04-29 PROCEDURE — 80053 COMPREHEN METABOLIC PANEL: CPT | Performed by: EMERGENCY MEDICINE

## 2021-04-29 PROCEDURE — 99285 EMERGENCY DEPT VISIT HI MDM: CPT | Mod: 25

## 2021-07-07 ENCOUNTER — PATIENT MESSAGE (OUTPATIENT)
Dept: ADMINISTRATIVE | Facility: HOSPITAL | Age: 51
End: 2021-07-07

## 2021-10-07 ENCOUNTER — PATIENT MESSAGE (OUTPATIENT)
Dept: ADMINISTRATIVE | Facility: HOSPITAL | Age: 51
End: 2021-10-07

## 2021-12-30 DIAGNOSIS — Z12.31 OTHER SCREENING MAMMOGRAM: ICD-10-CM

## 2022-01-04 ENCOUNTER — PATIENT MESSAGE (OUTPATIENT)
Dept: ADMINISTRATIVE | Facility: HOSPITAL | Age: 52
End: 2022-01-04

## 2022-01-04 NOTE — LETTER
January 12, 2022    Alexsander Powers  [address] [place] [address]             [place] [id number]  [facility] [address]  Phone: [phone] Dear Toni, [facility] is committed to your overall health. Currently, we have Kathy Cruz MD listed as your primary care provider. If this is incorrect, please let us know by emailing or contacting our office at [phone] so we can update our records.     Our records indicate that you are due for a routine exam with Kathy Cruz MD.  Please schedule online through your [email or web address]  account or call our office at [phone].   If you already have a scheduled office visit,  please disregard this message.  Your [facility] care team is committed to supporting your overall health. We look forward to seeing you soon and appreciate the opportunity to serve you.     You may also be overdue for the following:       Health Maintenance Due   Topic Date Due    COVID-19 Vaccine (1) Never done    Pneumococcal Vaccines (Age 0-64) (1 of 4 - PCV13) Never done    HIV Screening  Never done    TETANUS VACCINE  Never done    Mammogram  Never done    Colorectal Cancer Screening  Never done    Influenza Vaccine (1) Never done      An order has been placed for you for your mammogram.  We will be happy to schedule it or send the order to the facility of your choosing.        Sincerely,             Kathy Cruz MD and your [facility] Primary [facility] Team

## 2022-01-12 NOTE — TELEPHONE ENCOUNTER
"Attempted to outreach patient for pre-visit via "[email or web address]", no answer after a week. Sending outreach via Mail Out Letter now.    "

## 2022-05-31 ENCOUNTER — PATIENT MESSAGE (OUTPATIENT)
Dept: ADMINISTRATIVE | Facility: HOSPITAL | Age: 52
End: 2022-05-31

## 2022-07-11 ENCOUNTER — PATIENT OUTREACH (OUTPATIENT)
Dept: ADMINISTRATIVE | Facility: HOSPITAL | Age: 52
End: 2022-07-11
Payer: COMMERCIAL

## 2022-07-11 DIAGNOSIS — Z12.11 COLON CANCER SCREENING: Primary | ICD-10-CM

## 2022-07-11 NOTE — PROGRESS NOTES
CRS gap report review with patient outreach. Patient plan to call and schedule mammogram. Patient would like Fit Kit mailed. WOG for Fit test.

## 2022-07-12 ENCOUNTER — PATIENT OUTREACH (OUTPATIENT)
Dept: ADMINISTRATIVE | Facility: HOSPITAL | Age: 52
End: 2022-07-12
Payer: COMMERCIAL

## 2022-08-17 DIAGNOSIS — I10 HYPERTENSION: ICD-10-CM

## 2022-08-22 ENCOUNTER — PATIENT MESSAGE (OUTPATIENT)
Dept: ADMINISTRATIVE | Facility: HOSPITAL | Age: 52
End: 2022-08-22
Payer: COMMERCIAL

## 2022-08-24 ENCOUNTER — PATIENT MESSAGE (OUTPATIENT)
Dept: ADMINISTRATIVE | Facility: HOSPITAL | Age: 52
End: 2022-08-24
Payer: COMMERCIAL

## 2022-09-15 ENCOUNTER — PATIENT MESSAGE (OUTPATIENT)
Dept: ADMINISTRATIVE | Facility: HOSPITAL | Age: 52
End: 2022-09-15
Payer: COMMERCIAL

## 2022-10-05 ENCOUNTER — PATIENT MESSAGE (OUTPATIENT)
Dept: FAMILY MEDICINE | Facility: CLINIC | Age: 52
End: 2022-10-05
Payer: COMMERCIAL

## 2022-10-11 ENCOUNTER — PATIENT MESSAGE (OUTPATIENT)
Dept: ADMINISTRATIVE | Facility: HOSPITAL | Age: 52
End: 2022-10-11
Payer: COMMERCIAL

## 2022-12-02 ENCOUNTER — OFFICE VISIT (OUTPATIENT)
Dept: FAMILY MEDICINE | Facility: CLINIC | Age: 52
End: 2022-12-02
Payer: COMMERCIAL

## 2022-12-02 ENCOUNTER — LAB VISIT (OUTPATIENT)
Dept: LAB | Facility: CLINIC | Age: 52
End: 2022-12-02
Payer: COMMERCIAL

## 2022-12-02 VITALS
OXYGEN SATURATION: 98 % | DIASTOLIC BLOOD PRESSURE: 80 MMHG | WEIGHT: 209 LBS | HEIGHT: 62 IN | HEART RATE: 84 BPM | SYSTOLIC BLOOD PRESSURE: 130 MMHG | TEMPERATURE: 98 F | BODY MASS INDEX: 38.46 KG/M2

## 2022-12-02 DIAGNOSIS — R09.81 NASAL CONGESTION: ICD-10-CM

## 2022-12-02 DIAGNOSIS — R42 DIZZINESS: ICD-10-CM

## 2022-12-02 DIAGNOSIS — E78.1 HYPERTRIGLYCERIDEMIA: ICD-10-CM

## 2022-12-02 DIAGNOSIS — R53.83 FATIGUE, UNSPECIFIED TYPE: ICD-10-CM

## 2022-12-02 DIAGNOSIS — R05.9 COUGH, UNSPECIFIED TYPE: ICD-10-CM

## 2022-12-02 DIAGNOSIS — Z12.31 ENCOUNTER FOR SCREENING MAMMOGRAM FOR MALIGNANT NEOPLASM OF BREAST: ICD-10-CM

## 2022-12-02 DIAGNOSIS — I10 PRIMARY HYPERTENSION: Primary | ICD-10-CM

## 2022-12-02 DIAGNOSIS — D56.3 BETA THALASSEMIA MINOR: ICD-10-CM

## 2022-12-02 DIAGNOSIS — I10 PRIMARY HYPERTENSION: ICD-10-CM

## 2022-12-02 LAB
ALBUMIN SERPL BCP-MCNC: 3.9 G/DL (ref 3.5–5.2)
ALP SERPL-CCNC: 99 U/L (ref 55–135)
ALT SERPL W/O P-5'-P-CCNC: 48 U/L (ref 10–44)
ANION GAP SERPL CALC-SCNC: 10 MMOL/L (ref 8–16)
AST SERPL-CCNC: 30 U/L (ref 10–40)
BASOPHILS # BLD AUTO: 0.05 K/UL (ref 0–0.2)
BASOPHILS NFR BLD: 0.5 % (ref 0–1.9)
BILIRUB SERPL-MCNC: 0.7 MG/DL (ref 0.1–1)
BUN SERPL-MCNC: 8 MG/DL (ref 6–20)
CALCIUM SERPL-MCNC: 9.5 MG/DL (ref 8.7–10.5)
CHLORIDE SERPL-SCNC: 101 MMOL/L (ref 95–110)
CHOLEST SERPL-MCNC: 194 MG/DL (ref 120–199)
CHOLEST/HDLC SERPL: 3.8 {RATIO} (ref 2–5)
CO2 SERPL-SCNC: 26 MMOL/L (ref 23–29)
CREAT SERPL-MCNC: 0.7 MG/DL (ref 0.5–1.4)
DIFFERENTIAL METHOD: ABNORMAL
EOSINOPHIL # BLD AUTO: 0.7 K/UL (ref 0–0.5)
EOSINOPHIL NFR BLD: 5.9 % (ref 0–8)
ERYTHROCYTE [DISTWIDTH] IN BLOOD BY AUTOMATED COUNT: 17 % (ref 11.5–14.5)
EST. GFR  (NO RACE VARIABLE): >60 ML/MIN/1.73 M^2
GLUCOSE SERPL-MCNC: 100 MG/DL (ref 70–110)
HCT VFR BLD AUTO: 37.7 % (ref 37–48.5)
HDLC SERPL-MCNC: 51 MG/DL (ref 40–75)
HDLC SERPL: 26.3 % (ref 20–50)
HGB BLD-MCNC: 11.3 G/DL (ref 12–16)
IMM GRANULOCYTES # BLD AUTO: 0.02 K/UL (ref 0–0.04)
IMM GRANULOCYTES NFR BLD AUTO: 0.2 % (ref 0–0.5)
LDLC SERPL CALC-MCNC: 120 MG/DL (ref 63–159)
LYMPHOCYTES # BLD AUTO: 2.5 K/UL (ref 1–4.8)
LYMPHOCYTES NFR BLD: 22.2 % (ref 18–48)
MCH RBC QN AUTO: 19 PG (ref 27–31)
MCHC RBC AUTO-ENTMCNC: 30 G/DL (ref 32–36)
MCV RBC AUTO: 63 FL (ref 82–98)
MONOCYTES # BLD AUTO: 0.6 K/UL (ref 0.3–1)
MONOCYTES NFR BLD: 5.8 % (ref 4–15)
NEUTROPHILS # BLD AUTO: 7.2 K/UL (ref 1.8–7.7)
NEUTROPHILS NFR BLD: 65.4 % (ref 38–73)
NONHDLC SERPL-MCNC: 143 MG/DL
NRBC BLD-RTO: 0 /100 WBC
PLATELET # BLD AUTO: 261 K/UL (ref 150–450)
PMV BLD AUTO: ABNORMAL FL (ref 9.2–12.9)
POTASSIUM SERPL-SCNC: 4.3 MMOL/L (ref 3.5–5.1)
PROT SERPL-MCNC: 7.4 G/DL (ref 6–8.4)
RBC # BLD AUTO: 5.96 M/UL (ref 4–5.4)
SODIUM SERPL-SCNC: 137 MMOL/L (ref 136–145)
TRIGL SERPL-MCNC: 115 MG/DL (ref 30–150)
TSH SERPL DL<=0.005 MIU/L-ACNC: 2.13 UIU/ML (ref 0.4–4)
WBC # BLD AUTO: 11.03 K/UL (ref 3.9–12.7)

## 2022-12-02 PROCEDURE — 3079F PR MOST RECENT DIASTOLIC BLOOD PRESSURE 80-89 MM HG: ICD-10-PCS | Mod: CPTII,S$GLB,,

## 2022-12-02 PROCEDURE — 3008F BODY MASS INDEX DOCD: CPT | Mod: CPTII,S$GLB,,

## 2022-12-02 PROCEDURE — 36415 COLL VENOUS BLD VENIPUNCTURE: CPT | Mod: PN,,, | Performed by: STUDENT IN AN ORGANIZED HEALTH CARE EDUCATION/TRAINING PROGRAM

## 2022-12-02 PROCEDURE — 99214 OFFICE O/P EST MOD 30 MIN: CPT | Mod: S$GLB,,,

## 2022-12-02 PROCEDURE — 3008F PR BODY MASS INDEX (BMI) DOCUMENTED: ICD-10-PCS | Mod: CPTII,S$GLB,,

## 2022-12-02 PROCEDURE — 84443 ASSAY THYROID STIM HORMONE: CPT

## 2022-12-02 PROCEDURE — 1160F RVW MEDS BY RX/DR IN RCRD: CPT | Mod: CPTII,S$GLB,,

## 2022-12-02 PROCEDURE — 99999 PR PBB SHADOW E&M-EST. PATIENT-LVL IV: CPT | Mod: PBBFAC,,,

## 2022-12-02 PROCEDURE — 3075F SYST BP GE 130 - 139MM HG: CPT | Mod: CPTII,S$GLB,,

## 2022-12-02 PROCEDURE — 85025 COMPLETE CBC W/AUTO DIFF WBC: CPT

## 2022-12-02 PROCEDURE — 80061 LIPID PANEL: CPT

## 2022-12-02 PROCEDURE — 36415 PR COLLECTION VENOUS BLOOD,VENIPUNCTURE: ICD-10-PCS | Mod: PN,,, | Performed by: STUDENT IN AN ORGANIZED HEALTH CARE EDUCATION/TRAINING PROGRAM

## 2022-12-02 PROCEDURE — 1159F PR MEDICATION LIST DOCUMENTED IN MEDICAL RECORD: ICD-10-PCS | Mod: CPTII,S$GLB,,

## 2022-12-02 PROCEDURE — 3075F PR MOST RECENT SYSTOLIC BLOOD PRESS GE 130-139MM HG: ICD-10-PCS | Mod: CPTII,S$GLB,,

## 2022-12-02 PROCEDURE — 99999 PR PBB SHADOW E&M-EST. PATIENT-LVL IV: ICD-10-PCS | Mod: PBBFAC,,,

## 2022-12-02 PROCEDURE — 1160F PR REVIEW ALL MEDS BY PRESCRIBER/CLIN PHARMACIST DOCUMENTED: ICD-10-PCS | Mod: CPTII,S$GLB,,

## 2022-12-02 PROCEDURE — 1159F MED LIST DOCD IN RCRD: CPT | Mod: CPTII,S$GLB,,

## 2022-12-02 PROCEDURE — 99214 PR OFFICE/OUTPT VISIT, EST, LEVL IV, 30-39 MIN: ICD-10-PCS | Mod: S$GLB,,,

## 2022-12-02 PROCEDURE — 3079F DIAST BP 80-89 MM HG: CPT | Mod: CPTII,S$GLB,,

## 2022-12-02 PROCEDURE — 80053 COMPREHEN METABOLIC PANEL: CPT

## 2022-12-02 RX ORDER — GABAPENTIN 100 MG/1
100 CAPSULE ORAL NIGHTLY
COMMUNITY
End: 2023-03-15 | Stop reason: SDUPTHER

## 2022-12-02 RX ORDER — LEVOCETIRIZINE DIHYDROCHLORIDE 5 MG/1
5 TABLET, FILM COATED ORAL NIGHTLY
Qty: 30 TABLET | Refills: 1 | Status: SHIPPED | OUTPATIENT
Start: 2022-12-02 | End: 2023-03-15

## 2022-12-02 RX ORDER — ALBUTEROL SULFATE 90 UG/1
2 AEROSOL, METERED RESPIRATORY (INHALATION) EVERY 6 HOURS PRN
Qty: 18 G | Refills: 0 | Status: SHIPPED | OUTPATIENT
Start: 2022-12-02 | End: 2023-03-15 | Stop reason: SDUPTHER

## 2022-12-02 RX ORDER — MECLIZINE HYDROCHLORIDE 25 MG/1
25 TABLET ORAL 3 TIMES DAILY PRN
Qty: 30 TABLET | Refills: 2 | Status: SHIPPED | OUTPATIENT
Start: 2022-12-02 | End: 2023-03-15

## 2022-12-02 NOTE — PATIENT INSTRUCTIONS

## 2022-12-02 NOTE — PROGRESS NOTES
Subjective:       Patient ID: Alexsander Powers is a 52 y.o. female.    Chief Complaint: Follow-up (Possible labs), Dizziness (Daily, has history of vertigo . Pt states feels the same), Edema (Eddie feet and legs. ), Spasms, Cough (Congestion. Prod brown at times. Some wheezing and sob. No fever, no sore throat , ears feel full no pain. Symptoms began yesterday.), and Headache    Patient presents to the clinic for a follow up.     Patient Active Problem List:     Chest pain on breathing     Metabolic acidosis     Bipolar affective disorder     Obesity     Microcytic anemia     Hypokalemia     Hypertriglyceridemia     Systolic murmur     NSAID long-term use     Hypertension     Beta thalassemia minor    Has complaint of vertigo. Has been having dizziness every day for at least a year. She has never seen ENT regarding this.     Has complaint of cough and congestion. Started yesterday. Denies fever.     Has complaint of lower leg swelling. She does follow a low salt diet.     Has complaint of headache. States happens a few times per day. Relieved with Aleve.     [id number]-Wwlr order- She has a history of swollen lymph nodes. She was supposed to follow up on this but did not have insurance at the time and was not able to.     Has no other complaints or concerns.     Patient educated on plan of care, verbalized understanding.      Review of Systems   Constitutional:  Negative for activity change, appetite change, chills, diaphoresis and fever.   HENT:  Positive for congestion. Negative for ear pain, postnasal drip, sinus pressure, sneezing and sore throat.         Ear fullness   Eyes:  Negative for pain, discharge, redness and itching.   Respiratory:  Positive for cough. Negative for apnea, chest tightness, shortness of breath and wheezing.    Cardiovascular:  Negative for chest pain and leg swelling.   Gastrointestinal:  Negative for abdominal distention, abdominal pain, constipation, diarrhea, nausea and vomiting.    Genitourinary:  Negative for difficulty urinating, dysuria, flank pain and frequency.   Skin:  Negative for color change, rash and wound.   Neurological:  Negative for dizziness.   All other systems reviewed and are negative.    Patient Active Problem List   Diagnosis    Chest pain on breathing    Metabolic acidosis    Bipolar affective disorder    Obesity    Microcytic anemia    Hypokalemia    Hypertriglyceridemia    Systolic murmur    NSAID long-term use    Hypertension    Beta thalassemia minor       Objective:      Physical Exam  Vitals and nursing note reviewed.   Constitutional:       General: She is not in acute distress.     Appearance: Normal appearance. She is well-developed.   HENT:      Head: Normocephalic.      Nose: Nose normal.   Eyes:      Conjunctiva/sclera: Conjunctivae normal.      Pupils: Pupils are equal, round, and reactive to light.   Cardiovascular:      Rate and Rhythm: Normal rate and regular rhythm.      Heart sounds: Normal heart sounds.   Pulmonary:      Effort: Pulmonary effort is normal. No respiratory distress.      Breath sounds: Normal breath sounds.   Abdominal:      General: Bowel sounds are normal. There is no distension.      Palpations: Abdomen is soft.      Tenderness: There is no abdominal tenderness.   Musculoskeletal:      Cervical back: Normal range of motion and neck supple.   Skin:     General: Skin is warm and dry.      Findings: No rash.   Neurological:      Mental Status: She is alert and oriented to person, place, and time.   Psychiatric:         Behavior: Behavior normal.       Lab Results   Component Value Date    WBC 9.54 04/29/2021    HGB 10.6 (L) 04/29/2021    HCT 35.1 (L) 04/29/2021     04/29/2021    CHOL 172 02/07/2019    TRIG 189 (H) 02/07/2019    HDL 50 02/07/2019    ALT 77 (H) 04/29/2021    AST 57 (H) 04/29/2021     04/29/2021    K 3.3 (L) 04/29/2021     04/29/2021    CREATININE 0.8 04/29/2021    BUN 7 04/29/2021    CO2 25 04/29/2021     "INR 0.9 11/06/2019    HGBA1C 5.5 06/13/2016     The ASCVD Risk score (Graham DAVIES, et al., 2019) failed to calculate for the following reasons:    Cannot find a previous HDL lab    Cannot find a previous total cholesterol lab  Visit Vitals  /80 (BP Location: Left arm, Patient Position: Sitting, BP Method: Large (Manual))   Pulse 84   Temp 98.4 °F (36.9 °C) (Temporal)   Ht 5' 1.5" (1.562 m)   Wt 94.8 kg (208 lb 15.9 oz)   SpO2 98%   BMI 38.85 kg/m²      Assessment:       1. Primary hypertension    2. Encounter for screening mammogram for malignant neoplasm of breast    3. Hypertriglyceridemia    4. Beta thalassemia minor    5. Fatigue, unspecified type    6. Dizziness    7. Nasal congestion    8. Cough, unspecified type        Plan:       1. Primary hypertension  -     Comprehensive metabolic panel; Future; Expected date: 12/02/2022    2. Encounter for screening mammogram for malignant neoplasm of breast  -     Mammo Digital Screening Bilat w/ Nando; Future; Expected date: 12/02/2022    3. Hypertriglyceridemia  -     Lipid Panel; Future; Expected date: 12/02/2022    4. Beta thalassemia minor  -     CBC auto differential; Future; Expected date: 12/02/2022    5. Fatigue, unspecified type  -     TSH; Future; Expected date: 12/02/2022    6. Dizziness  -     meclizine (ANTIVERT) 25 mg tablet; Take 1 tablet (25 mg total) by mouth 3 (three) times daily as needed for Dizziness.  Dispense: 30 tablet; Refill: 2  -     Ambulatory referral/consult to ENT; Future; Expected date: 12/09/2022    7. Nasal congestion  -     levocetirizine (XYZAL) 5 MG tablet; Take 1 tablet (5 mg total) by mouth every evening.  Dispense: 30 tablet; Refill: 1    8. Cough, unspecified type  -     albuterol (VENTOLIN HFA) 90 mcg/actuation inhaler; Inhale 2 puffs into the lungs every 6 (six) hours as needed for Wheezing. Rescue  Dispense: 18 g; Refill: 0     Follow up if symptoms worsen or fail to improve.             "

## 2023-03-13 ENCOUNTER — HOSPITAL ENCOUNTER (EMERGENCY)
Facility: HOSPITAL | Age: 53
Discharge: HOME | End: 2023-03-14
Attending: EMERGENCY MEDICINE
Payer: COMMERCIAL

## 2023-03-13 DIAGNOSIS — R07.9 CHEST PAIN: ICD-10-CM

## 2023-03-13 PROCEDURE — 99285 EMERGENCY DEPT VISIT HI MDM: CPT

## 2023-03-13 PROCEDURE — 93010 ELECTROCARDIOGRAM REPORT: CPT | Mod: ,,, | Performed by: INTERNAL MEDICINE

## 2023-03-13 PROCEDURE — 93010 EKG 12-LEAD: ICD-10-PCS | Mod: ,,, | Performed by: INTERNAL MEDICINE

## 2023-03-13 PROCEDURE — 93005 ELECTROCARDIOGRAM TRACING: CPT

## 2023-03-14 VITALS
TEMPERATURE: 98 F | RESPIRATION RATE: 18 BRPM | HEART RATE: 75 BPM | SYSTOLIC BLOOD PRESSURE: 129 MMHG | HEIGHT: 61 IN | WEIGHT: 209 LBS | OXYGEN SATURATION: 99 % | DIASTOLIC BLOOD PRESSURE: 81 MMHG | BODY MASS INDEX: 39.46 KG/M2

## 2023-03-14 LAB
ANION GAP SERPL CALC-SCNC: 13 MMOL/L (ref 8–16)
BASOPHILS # BLD AUTO: 0.04 K/UL (ref 0–0.2)
BASOPHILS NFR BLD: 0.4 % (ref 0–1.9)
BUN SERPL-MCNC: 11 MG/DL (ref 6–20)
CALCIUM SERPL-MCNC: 9.6 MG/DL (ref 8.7–10.5)
CHLORIDE SERPL-SCNC: 105 MMOL/L (ref 95–110)
CO2 SERPL-SCNC: 21 MMOL/L (ref 23–29)
CREAT SERPL-MCNC: 0.8 MG/DL (ref 0.5–1.4)
DIFFERENTIAL METHOD: ABNORMAL
EOSINOPHIL # BLD AUTO: 0.5 K/UL (ref 0–0.5)
EOSINOPHIL NFR BLD: 5 % (ref 0–8)
ERYTHROCYTE [DISTWIDTH] IN BLOOD BY AUTOMATED COUNT: 17.2 % (ref 11.5–14.5)
EST. GFR  (NO RACE VARIABLE): >60 ML/MIN/1.73 M^2
GLUCOSE SERPL-MCNC: 110 MG/DL (ref 70–110)
HCT VFR BLD AUTO: 35.6 % (ref 37–48.5)
HGB BLD-MCNC: 10.6 G/DL (ref 12–16)
IMM GRANULOCYTES # BLD AUTO: 0.03 K/UL (ref 0–0.04)
IMM GRANULOCYTES NFR BLD AUTO: 0.3 % (ref 0–0.5)
LYMPHOCYTES # BLD AUTO: 3.4 K/UL (ref 1–4.8)
LYMPHOCYTES NFR BLD: 32.2 % (ref 18–48)
MCH RBC QN AUTO: 18.5 PG (ref 27–31)
MCHC RBC AUTO-ENTMCNC: 29.8 G/DL (ref 32–36)
MCV RBC AUTO: 62 FL (ref 82–98)
MONOCYTES # BLD AUTO: 0.7 K/UL (ref 0.3–1)
MONOCYTES NFR BLD: 6.8 % (ref 4–15)
NEUTROPHILS # BLD AUTO: 5.8 K/UL (ref 1.8–7.7)
NEUTROPHILS NFR BLD: 55.3 % (ref 38–73)
NRBC BLD-RTO: 0 /100 WBC
PLATELET # BLD AUTO: 268 K/UL (ref 150–450)
PMV BLD AUTO: ABNORMAL FL (ref 9.2–12.9)
POTASSIUM SERPL-SCNC: 4 MMOL/L (ref 3.5–5.1)
RBC # BLD AUTO: 5.72 M/UL (ref 4–5.4)
SODIUM SERPL-SCNC: 139 MMOL/L (ref 136–145)
TROPONIN I SERPL DL<=0.01 NG/ML-MCNC: <0.006 NG/ML (ref 0–0.03)
WBC # BLD AUTO: 10.48 K/UL (ref 3.9–12.7)

## 2023-03-14 PROCEDURE — 84484 ASSAY OF TROPONIN QUANT: CPT | Performed by: EMERGENCY MEDICINE

## 2023-03-14 PROCEDURE — 85025 COMPLETE CBC W/AUTO DIFF WBC: CPT | Performed by: EMERGENCY MEDICINE

## 2023-03-14 PROCEDURE — 36415 COLL VENOUS BLD VENIPUNCTURE: CPT | Performed by: EMERGENCY MEDICINE

## 2023-03-14 PROCEDURE — 80048 BASIC METABOLIC PNL TOTAL CA: CPT | Performed by: EMERGENCY MEDICINE

## 2023-03-14 NOTE — ED PROVIDER NOTES
Encounter Date: 3/13/2023    SCRIBE #1 NOTE: I, Shaun Paniagua, am scribing for, and in the presence of,  Roderick Killian MD.     History     Chief Complaint   Patient presents with    Chest Pain     Brought to ed via ems with cc of chest pain.  Pt advised her chest became painful and tight around 8pm.       Time seen by provider: 12:10 AM on 03/14/2023    Alexsander Powers is a 53 y.o. female who presents to the ED via EMS with an onset of left-sided chest pain that she describes as a tight sensation that began 4 hours ago, as well as nausea. PMHx of anemia, HTN, and HLD. The patient states she was just sitting down when her pain began. The pain was severe for the first hour, but now it just mild. She states that she was not sweating during this episode, but she did feel very hot. She states the sensation does not feel like heartburn. The patient denies vomiting, sweating, or any other symptoms at this time. She denies a history of heart and lung problems. She drinks recreationally, but denies any smoking. There is no pertinent PSHx.     The history is provided by the patient.   Review of patient's allergies indicates:   Allergen Reactions    Morphine Blisters    Phenergan [promethazine]      Past Medical History:   Diagnosis Date    Bipolar affective disorder     Elevated troponin 6/14/2016    Encounter for blood transfusion     Scoliosis     Scoliosis      Past Surgical History:   Procedure Laterality Date    ABDOMINAL SURGERY      ANKLE FRACTURE SURGERY      APPENDECTOMY      BACK SURGERY      HYSTERECTOMY       Family History   Problem Relation Age of Onset    Cancer Mother     Heart disease Father     Hypertension Father     Diabetes Father      Social History     Tobacco Use    Smoking status: Never    Smokeless tobacco: Never   Substance Use Topics    Alcohol use: Yes     Comment: rarely    Drug use: No     Review of Systems   Constitutional:  Negative for diaphoresis and fever.   HENT:  Negative for sore  throat.    Respiratory:  Negative for shortness of breath.    Cardiovascular:  Positive for chest pain.   Gastrointestinal:  Positive for nausea. Negative for vomiting.   Genitourinary:  Negative for dysuria.   Musculoskeletal:  Negative for back pain.   Skin:  Negative for rash.   Neurological:  Negative for weakness.   Hematological:  Does not bruise/bleed easily.     Physical Exam     Initial Vitals [03/13/23 2251]   BP Pulse Resp Temp SpO2   133/86 83 14 97.8 °F (36.6 °C) 100 %      MAP       --         Physical Exam    Nursing note and vitals reviewed.  Constitutional: She appears well-developed and well-nourished. She is not diaphoretic. No distress.   HENT:   Head: Normocephalic and atraumatic.   Mouth/Throat: Oropharynx is clear and moist.   Eyes: Conjunctivae are normal.   Neck: Neck supple.   Cardiovascular:  Normal rate, regular rhythm, normal heart sounds and intact distal pulses.     Exam reveals no gallop and no friction rub.       No murmur heard.  Pulses:       Radial pulses are 2+ on the right side and 2+ on the left side.        Dorsalis pedis pulses are 2+ on the right side and 2+ on the left side.        Posterior tibial pulses are 2+ on the right side and 2+ on the left side.   Pulmonary/Chest: Breath sounds normal. She has no wheezes. She has no rhonchi. She has no rales.   Abdominal: Abdomen is soft. She exhibits no distension. There is no abdominal tenderness.   Musculoskeletal:         General: Normal range of motion.      Cervical back: Neck supple.      Right lower leg: No tenderness. No edema.      Left lower leg: No tenderness. No edema.     Neurological: She is alert and oriented to person, place, and time.   5/5 strength and sensation to BUE's and BLE's.    Skin: No rash noted. No erythema.   Psychiatric: Her speech is normal.       ED Course   Procedures  Labs Reviewed   CBC W/ AUTO DIFFERENTIAL - Abnormal; Notable for the following components:       Result Value    RBC 5.72 (*)      Hemoglobin 10.6 (*)     Hematocrit 35.6 (*)     MCV 62 (*)     MCH 18.5 (*)     MCHC 29.8 (*)     RDW 17.2 (*)     All other components within normal limits    Narrative:     Recoll. [id number] by [facility] at 03/14/2023 00:59, reason: clotted   BASIC METABOLIC PANEL - Abnormal; Notable for the following components:    CO2 21 (*)     All other components within normal limits   TROPONIN I          Imaging Results              X-Ray Chest 1 View (In process)  Result time 03/14/23 00:26:59                     Medications - No data to display  Medical Decision Making:   History:   Old Medical Records: I decided to obtain old medical records.  Independently Interpreted Test(s):   I have ordered and independently interpreted EKG Reading(s) - see prior notes  Clinical Tests:   Lab Tests: Ordered and Reviewed  Radiological Study: Ordered and Reviewed  Medical Tests: Ordered and Reviewed        Scribe Attestation:   Scribe #1: I performed the above scribed service and the documentation accurately describes the services I performed. I attest to the accuracy of the note.      ED Course as of 03/14/23 0218   Tue Mar 14, 2023   0007 EKG:  NSR, rate of 70, normal intervals and axis.  Minor, nonspecific ST/T changes similar to last prior. No significant ST elevation or depression.  No sign of acute ischemia or infarction.   [MR]   0055 CXR:  NAD. (Independently interpreted by me)   [MR]      ED Course User Index  [MR] Roderick Klilian MD          I, Dr. Roderick Killian, personally performed the services described in this documentation. All medical record entries made by the scribe were at my direction and in my presence.  I have reviewed the chart and agree that the record reflects my personal performance and is accurate and complete. Roderick Killian MD.  2:18 AM 03/14/2023    Alexsander Powers is a 53 y.o. female presenting with chest pain in this well-appearing patient.  Patient has some features such as finger tingling  bilaterally that may correspond to her endorsement of stress.   I have low suspicion for ACS.  Cardiac biomarker negative hours after onset of symptoms with subsequent resolution.  EKG without ischemia or arrhythmia.  I have very low suspicion for aortic dissection or PE.  I do not think further D-dimer or CT angiography of the chest are indicated.  She is appropriate for outpatient cardiology follow-up.  I do not think she requires more prolonged observation for serial troponin or cardiac monitoring.  Return precautions reviewed.  Low risk HEART score.         Clinical Impression:   Final diagnoses:  [R07.9] Chest pain        ED Disposition Condition    Discharge Stable          ED Prescriptions    None       Follow-up Information       Follow up With Specialties Details Why Contact Info    Marin Ojeda MD Cardiovascular Disease, Cardiology  Cardiology, 2-3 days [address]  [address]  [facility] [address]  [phone]               Roderick Killian MD  03/14/23 5230

## 2023-03-15 ENCOUNTER — TELEPHONE (OUTPATIENT)
Dept: FAMILY MEDICINE | Facility: CLINIC | Age: 53
End: 2023-03-15

## 2023-03-15 ENCOUNTER — PATIENT MESSAGE (OUTPATIENT)
Dept: FAMILY MEDICINE | Facility: CLINIC | Age: 53
End: 2023-03-15

## 2023-03-15 ENCOUNTER — OFFICE VISIT (OUTPATIENT)
Dept: FAMILY MEDICINE | Facility: CLINIC | Age: 53
End: 2023-03-15
Payer: COMMERCIAL

## 2023-03-15 VITALS
OXYGEN SATURATION: 99 % | DIASTOLIC BLOOD PRESSURE: 108 MMHG | HEIGHT: 62 IN | BODY MASS INDEX: 38.06 KG/M2 | SYSTOLIC BLOOD PRESSURE: 160 MMHG | WEIGHT: 206.81 LBS | TEMPERATURE: 99 F | HEART RATE: 83 BPM

## 2023-03-15 DIAGNOSIS — R05.9 COUGH, UNSPECIFIED TYPE: ICD-10-CM

## 2023-03-15 DIAGNOSIS — I10 ESSENTIAL HYPERTENSION: Primary | ICD-10-CM

## 2023-03-15 DIAGNOSIS — M54.2 NECK PAIN: ICD-10-CM

## 2023-03-15 DIAGNOSIS — I10 ESSENTIAL HYPERTENSION: ICD-10-CM

## 2023-03-15 DIAGNOSIS — K21.9 GASTROESOPHAGEAL REFLUX DISEASE WITHOUT ESOPHAGITIS: ICD-10-CM

## 2023-03-15 DIAGNOSIS — F41.9 ANXIETY: ICD-10-CM

## 2023-03-15 PROCEDURE — 3008F PR BODY MASS INDEX (BMI) DOCUMENTED: ICD-10-PCS | Mod: CPTII,,,

## 2023-03-15 PROCEDURE — 1159F PR MEDICATION LIST DOCUMENTED IN MEDICAL RECORD: ICD-10-PCS | Mod: CPTII,,,

## 2023-03-15 PROCEDURE — 3080F DIAST BP >= 90 MM HG: CPT | Mod: CPTII,,,

## 2023-03-15 PROCEDURE — 1160F RVW MEDS BY RX/DR IN RCRD: CPT | Mod: CPTII,,,

## 2023-03-15 PROCEDURE — 1160F PR REVIEW ALL MEDS BY PRESCRIBER/CLIN PHARMACIST DOCUMENTED: ICD-10-PCS | Mod: CPTII,,,

## 2023-03-15 PROCEDURE — 3077F SYST BP >= 140 MM HG: CPT | Mod: CPTII,,,

## 2023-03-15 PROCEDURE — 99214 PR OFFICE/OUTPT VISIT, EST, LEVL IV, 30-39 MIN: ICD-10-PCS | Mod: ,,,

## 2023-03-15 PROCEDURE — 3077F PR MOST RECENT SYSTOLIC BLOOD PRESSURE >= 140 MM HG: ICD-10-PCS | Mod: CPTII,,,

## 2023-03-15 PROCEDURE — 3080F PR MOST RECENT DIASTOLIC BLOOD PRESSURE >= 90 MM HG: ICD-10-PCS | Mod: CPTII,,,

## 2023-03-15 PROCEDURE — 3008F BODY MASS INDEX DOCD: CPT | Mod: CPTII,,,

## 2023-03-15 PROCEDURE — 4010F ACE/ARB THERAPY RXD/TAKEN: CPT | Mod: CPTII,,,

## 2023-03-15 PROCEDURE — 1159F MED LIST DOCD IN RCRD: CPT | Mod: CPTII,,,

## 2023-03-15 PROCEDURE — 4010F PR ACE/ARB THEARPY RXD/TAKEN: ICD-10-PCS | Mod: CPTII,,,

## 2023-03-15 PROCEDURE — 99214 OFFICE O/P EST MOD 30 MIN: CPT | Mod: ,,,

## 2023-03-15 RX ORDER — GABAPENTIN 100 MG/1
100 CAPSULE ORAL NIGHTLY
Qty: 90 CAPSULE | Refills: 1 | Status: SHIPPED | OUTPATIENT
Start: 2023-03-15

## 2023-03-15 RX ORDER — ALBUTEROL SULFATE 90 UG/1
2 AEROSOL, METERED RESPIRATORY (INHALATION) EVERY 6 HOURS PRN
Qty: 18 G | Refills: 0 | Status: SHIPPED | OUTPATIENT
Start: 2023-03-15

## 2023-03-15 RX ORDER — LISINOPRIL AND HYDROCHLOROTHIAZIDE 10; 12.5 MG/1; MG/1
1 TABLET ORAL DAILY
Qty: 30 TABLET | Refills: 1 | Status: SHIPPED | OUTPATIENT
Start: 2023-03-15 | End: 2023-03-15 | Stop reason: SDUPTHER

## 2023-03-15 RX ORDER — HYDROXYZINE PAMOATE 25 MG/1
25 CAPSULE ORAL EVERY 8 HOURS PRN
Qty: 30 CAPSULE | Refills: 0 | Status: SHIPPED | OUTPATIENT
Start: 2023-03-15 | End: 2023-05-31

## 2023-03-15 RX ORDER — OMEPRAZOLE 40 MG/1
40 CAPSULE, DELAYED RELEASE ORAL DAILY
Qty: 30 CAPSULE | Refills: 3 | Status: SHIPPED | OUTPATIENT
Start: 2023-03-15 | End: 2023-04-18 | Stop reason: SDUPTHER

## 2023-03-15 RX ORDER — LISINOPRIL AND HYDROCHLOROTHIAZIDE 10; 12.5 MG/1; MG/1
1 TABLET ORAL DAILY
Qty: 30 TABLET | Refills: 1 | Status: SHIPPED | OUTPATIENT
Start: 2023-03-15 | End: 2023-04-18 | Stop reason: SDUPTHER

## 2023-03-15 NOTE — TELEPHONE ENCOUNTER
----- Message from Humaira Llanes sent at 3/15/2023 12:21 PM CDT -----  Contact: zoila  Type:  Pharmacy Calling to Clarify an RX    Name of Caller:  Zoila  Pharmacy Name:    [facility] - [place], [place] - [address]  [address]  [place] [address]  Phone: [phone] Fax: [phone]    Prescription Name:  albuterol (VENTOLIN HFA) 90 mcg/actuation inhaler  What do they need to clarify?:  needs permission to dispense as generic  Best Call Back Number:  [phone]  Additional Information:

## 2023-03-15 NOTE — TELEPHONE ENCOUNTER
----- Message from Lenora Atwood sent at 3/15/2023  1:53 PM CDT -----  Contact: SARI TRIPATHI  Type:  RX Refill Request    Who Called:  Pt     Refill or New Rx:  NEW     RX Name and Strength:   lisinopriL-hydrochlorothiazide (PRINZIDE,ZESTORETIC) 10-12.5 mg per tablet.   How is the patient currently taking it? (ex. 1XDay):  N/A     Is this a 30 day or 90 day RX:  30 DAY    Preferred Pharmacy with phone number:      [id number] - [place], [place] - [address]  [address]  [place] [address]  Phone: [phone] Fax: [phone]    Local or Mail Order:  LOCAL    Ordering Provider:  DANELLE ORTEZ Call Back Number:  [phone] (home)     Additional Information:   Pt is requesting to speak with nurse/MA regarding Rx lisinopriL-hydrochlorothiazide (PRINZIDE,ZESTORETIC) 10-12.5 mg per tablet.  Patient is currently at pharmacy. Pharmacy is stating did not received refill request.

## 2023-03-15 NOTE — PATIENT INSTRUCTIONS

## 2023-03-15 NOTE — PROGRESS NOTES
Subjective:       Patient ID: Alexsander Powers is a 53 y.o. female.    Chief Complaint: Hypertension (Has hx: pt states her bp has been high. Seen e.r. recently noted high. Was having chest pain also. Denies any chest pain, just tightness at times.)    Patient presents to the clinic with complaint of hypertension.     States she was recently seen in the ED with chest pain and hypertension.   She states when she called the ambulance her BP was > 200.     Hypertension-  BP Readings from Last 3 Encounters:  03/15/23 : (!) 160/108  03/14/23 : 129/81  12/02/22 : 130/80    She does report that she did eat crawfish on Saturday but has watched her diet since. She is trying to follow a low sodium diet.     She does report anxiety but does not want to start a daily medication. She thinks her anxiety may be contributing to her elevated BP.     She also reports a burning sensation in her chest. States she has suffered with reflux in the past.     She would like a refill on her Gabapentin for chronic neck pain.     Patient educated on plan of care, verbalized understanding.      Review of Systems   Constitutional:  Negative for activity change, appetite change, chills, diaphoresis and fever.   HENT:  Negative for congestion, ear pain, postnasal drip, sinus pressure, sneezing and sore throat.    Eyes:  Negative for pain, discharge, redness and itching.   Respiratory:  Negative for apnea, cough, chest tightness, shortness of breath and wheezing.    Cardiovascular:  Negative for chest pain and leg swelling.   Gastrointestinal:  Negative for abdominal distention, abdominal pain, constipation, diarrhea, nausea and vomiting.        Reflux   Endocrine: Polyuria: bp.   Genitourinary:  Negative for difficulty urinating, dysuria, flank pain and frequency.   Musculoskeletal:  Positive for neck pain.   Skin:  Negative for color change, rash and wound.   Neurological:  Negative for dizziness.   Psychiatric/Behavioral:  The patient is  "nervous/anxious.    All other systems reviewed and are negative.      Patient Active Problem List   Diagnosis    Chest pain on breathing    Metabolic acidosis    Bipolar affective disorder    Obesity    Microcytic anemia    Hypokalemia    Hypertriglyceridemia    Systolic murmur    NSAID long-term use    Hypertension    Beta thalassemia minor       Objective:      Physical Exam    Lab Results   Component Value Date    WBC 10.48 03/14/2023    HGB 10.6 (L) 03/14/2023    HCT 35.6 (L) 03/14/2023     03/14/2023    CHOL 194 12/02/2022    TRIG 115 12/02/2022    HDL 51 12/02/2022    ALT 48 (H) 12/02/2022    AST 30 12/02/2022     03/14/2023    K 4.0 03/14/2023     03/14/2023    CREATININE 0.8 03/14/2023    BUN 11 03/14/2023    CO2 21 (L) 03/14/2023    TSH 2.126 12/02/2022    INR 0.9 11/06/2019    HGBA1C 5.5 06/13/2016     The 10-year ASCVD risk score (Graham DAVIES, et al., 2019) is: 3%    Values used to calculate the score:      Age: 53 years      Sex: Female      Is Non- : No      Diabetic: No      Tobacco smoker: No      Systolic Blood Pressure: 160 mmHg      Is BP treated: Yes      HDL Cholesterol: 51 mg/dL      Total Cholesterol: 194 mg/dL  Visit Vitals  BP (!) 160/108 (BP Location: Left arm, Patient Position: Sitting, BP Method: Medium (Manual))   Pulse 83   Temp 98.7 °F (37.1 °C) (Temporal)   Ht 5' 1.5" (1.562 m)   Wt 93.8 kg (206 lb 12.7 oz)   SpO2 99%   BMI 38.44 kg/m²      Assessment:       1. Essential hypertension    2. Cough, unspecified type    3. Anxiety    4. Neck pain    5. Gastroesophageal reflux disease without esophagitis        Plan:       1. Essential hypertension  -     lisinopriL-hydrochlorothiazide (PRINZIDE,ZESTORETIC) 10-12.5 mg per tablet; Take 1 tablet by mouth once daily.  Dispense: 30 tablet; Refill: 1  - The patient was counseled on HTN education, management and recommendations. The need for weight reduction was reinforced and a BMI goal of 19 to 25 was " set.  Patient was encouraged to adhere to a low sodium diet and a DASH diet was recommended. Patient was also encouraged to engage in routine exercise such as walking most days of the week greater than 30 minutes. Patient education materials were provided to the patient for home review and further reinforcement of topics discussed.      2. Cough, unspecified type  -     albuterol (VENTOLIN HFA) 90 mcg/actuation inhaler; Inhale 2 puffs into the lungs every 6 (six) hours as needed for Wheezing. Rescue  Dispense: 18 g; Refill: 0    3. Anxiety  -     hydrOXYzine pamoate (VISTARIL) 25 MG Cap; Take 1 capsule (25 mg total) by mouth every 8 (eight) hours as needed (anxiety).  Dispense: 30 capsule; Refill: 0    4. Neck pain  -     gabapentin (NEURONTIN) 100 MG capsule; Take 1 capsule (100 mg total) by mouth every evening.  Dispense: 90 capsule; Refill: 1    5. Gastroesophageal reflux disease without esophagitis  -     omeprazole (PRILOSEC) 40 MG capsule; Take 1 capsule (40 mg total) by mouth once daily.  Dispense: 30 capsule; Refill: 3   - Educated to avoid known foods which trigger reflux symptoms & to minimize/avoid high-fat foods, spicy foods, chocolate, caffeine, citrus, alcohol, & tomato products.     Follow up if symptoms worsen or fail to improve.

## 2023-03-15 NOTE — TELEPHONE ENCOUNTER
Spoke with the patient, let her know was sent to [facility] listed in her chart. She was fine with that [facility].

## 2023-03-26 ENCOUNTER — PATIENT MESSAGE (OUTPATIENT)
Dept: FAMILY MEDICINE | Facility: CLINIC | Age: 53
End: 2023-03-26
Payer: COMMERCIAL

## 2023-03-27 ENCOUNTER — TELEPHONE (OUTPATIENT)
Dept: FAMILY MEDICINE | Facility: CLINIC | Age: 53
End: 2023-03-27
Payer: COMMERCIAL

## 2023-03-28 ENCOUNTER — TELEPHONE (OUTPATIENT)
Dept: FAMILY MEDICINE | Facility: CLINIC | Age: 53
End: 2023-03-28
Payer: COMMERCIAL

## 2023-03-29 ENCOUNTER — LAB VISIT (OUTPATIENT)
Dept: LAB | Facility: CLINIC | Age: 53
End: 2023-03-29
Payer: COMMERCIAL

## 2023-03-29 ENCOUNTER — CLINICAL SUPPORT (OUTPATIENT)
Dept: FAMILY MEDICINE | Facility: CLINIC | Age: 53
End: 2023-03-29
Payer: COMMERCIAL

## 2023-03-29 ENCOUNTER — OFFICE VISIT (OUTPATIENT)
Dept: FAMILY MEDICINE | Facility: CLINIC | Age: 53
End: 2023-03-29
Payer: COMMERCIAL

## 2023-03-29 VITALS
BODY MASS INDEX: 38.54 KG/M2 | TEMPERATURE: 99 F | OXYGEN SATURATION: 97 % | HEART RATE: 95 BPM | HEIGHT: 62 IN | DIASTOLIC BLOOD PRESSURE: 96 MMHG | SYSTOLIC BLOOD PRESSURE: 142 MMHG | WEIGHT: 209.44 LBS

## 2023-03-29 VITALS — HEART RATE: 110 BPM | SYSTOLIC BLOOD PRESSURE: 148 MMHG | DIASTOLIC BLOOD PRESSURE: 82 MMHG

## 2023-03-29 DIAGNOSIS — R00.2 PALPITATIONS: ICD-10-CM

## 2023-03-29 DIAGNOSIS — I10 PRIMARY HYPERTENSION: ICD-10-CM

## 2023-03-29 DIAGNOSIS — R00.2 PALPITATIONS: Primary | ICD-10-CM

## 2023-03-29 DIAGNOSIS — R42 DIZZINESS: ICD-10-CM

## 2023-03-29 DIAGNOSIS — F41.9 ANXIETY: ICD-10-CM

## 2023-03-29 LAB
ALBUMIN SERPL BCP-MCNC: 3.7 G/DL (ref 3.5–5.2)
ALP SERPL-CCNC: 100 U/L (ref 55–135)
ALT SERPL W/O P-5'-P-CCNC: 138 U/L (ref 10–44)
ANION GAP SERPL CALC-SCNC: 8 MMOL/L (ref 8–16)
AST SERPL-CCNC: 62 U/L (ref 10–40)
BILIRUB SERPL-MCNC: 0.4 MG/DL (ref 0.1–1)
BUN SERPL-MCNC: 9 MG/DL (ref 6–20)
CALCIUM SERPL-MCNC: 9.9 MG/DL (ref 8.7–10.5)
CHLORIDE SERPL-SCNC: 105 MMOL/L (ref 95–110)
CO2 SERPL-SCNC: 26 MMOL/L (ref 23–29)
CREAT SERPL-MCNC: 0.8 MG/DL (ref 0.5–1.4)
EST. GFR  (NO RACE VARIABLE): >60 ML/MIN/1.73 M^2
GLUCOSE SERPL-MCNC: 117 MG/DL (ref 70–110)
POTASSIUM SERPL-SCNC: 4.3 MMOL/L (ref 3.5–5.1)
PROT SERPL-MCNC: 7.1 G/DL (ref 6–8.4)
SODIUM SERPL-SCNC: 139 MMOL/L (ref 136–145)
TSH SERPL DL<=0.005 MIU/L-ACNC: 2.95 UIU/ML (ref 0.4–4)

## 2023-03-29 PROCEDURE — 1160F PR REVIEW ALL MEDS BY PRESCRIBER/CLIN PHARMACIST DOCUMENTED: ICD-10-PCS | Mod: CPTII,,,

## 2023-03-29 PROCEDURE — 99214 OFFICE O/P EST MOD 30 MIN: CPT | Mod: ,,,

## 2023-03-29 PROCEDURE — 99214 PR OFFICE/OUTPT VISIT, EST, LEVL IV, 30-39 MIN: ICD-10-PCS | Mod: ,,,

## 2023-03-29 PROCEDURE — 1160F RVW MEDS BY RX/DR IN RCRD: CPT | Mod: CPTII,,,

## 2023-03-29 PROCEDURE — 1159F PR MEDICATION LIST DOCUMENTED IN MEDICAL RECORD: ICD-10-PCS | Mod: CPTII,,,

## 2023-03-29 PROCEDURE — 3077F PR MOST RECENT SYSTOLIC BLOOD PRESSURE >= 140 MM HG: ICD-10-PCS | Mod: CPTII,,,

## 2023-03-29 PROCEDURE — 3080F PR MOST RECENT DIASTOLIC BLOOD PRESSURE >= 90 MM HG: ICD-10-PCS | Mod: CPTII,,,

## 2023-03-29 PROCEDURE — 80053 COMPREHEN METABOLIC PANEL: CPT

## 2023-03-29 PROCEDURE — 84443 ASSAY THYROID STIM HORMONE: CPT

## 2023-03-29 PROCEDURE — 3080F DIAST BP >= 90 MM HG: CPT | Mod: CPTII,,,

## 2023-03-29 PROCEDURE — 4010F ACE/ARB THERAPY RXD/TAKEN: CPT | Mod: CPTII,,,

## 2023-03-29 PROCEDURE — 3077F SYST BP >= 140 MM HG: CPT | Mod: CPTII,,,

## 2023-03-29 PROCEDURE — 1159F MED LIST DOCD IN RCRD: CPT | Mod: CPTII,,,

## 2023-03-29 PROCEDURE — 4010F PR ACE/ARB THEARPY RXD/TAKEN: ICD-10-PCS | Mod: CPTII,,,

## 2023-03-29 PROCEDURE — 3008F PR BODY MASS INDEX (BMI) DOCUMENTED: ICD-10-PCS | Mod: CPTII,,,

## 2023-03-29 PROCEDURE — 3008F BODY MASS INDEX DOCD: CPT | Mod: CPTII,,,

## 2023-03-29 NOTE — PROGRESS NOTES
Subjective:       Patient ID: Alexsander Powers is a 53 y.o. female.    Chief Complaint: Headache (Head pressure ongoing for 1 week now. Some dizziness off and on. Some visual change, blurred vision. Some nausea , no vomiting, some diarrhea x 4 days. ) and Fatigue    Patient presents to the clinic with multiple complaints.     She reports she has head pressure x 1 week, palpitations, dizziness, fatigue, and blurred vision.   Reports nausea and diarrhea x 4 days (she states diarrhea seems to be improving).   Reports good appetite. Hydrating well.     States palpitations and dizziness happen multiple times throughout the day. She was evaluated in the ED on 3/13/23 with chest pains and elevated BP.  She was started on Lisinopril/HCTZ on 3/15/23. She states her BP has not been near as elevated since starting BP medication. She states her swelling has improved since starting the medication. She has not had her BP medicatiotoday.     BP Readings from Last 3 Encounters:  03/29/23 : (!) 142/96  03/29/23 : (!) 148/82  03/15/23 : (!) 160/108    She admits to having anxiety and depression. States Vistaril did not help. She does not want to start a daily anti-depressant at this time.    Patient educated on plan of care, verbalized understanding.      Review of Systems   Constitutional:  Positive for fatigue. Negative for activity change, appetite change, chills, diaphoresis and fever.   HENT:  Negative for congestion, ear pain, postnasal drip, sinus pressure, sneezing and sore throat.    Eyes:  Negative for pain, discharge, redness and itching.   Respiratory:  Negative for apnea, cough, chest tightness, shortness of breath and wheezing.    Cardiovascular:  Positive for palpitations. Negative for chest pain and leg swelling.   Gastrointestinal:  Negative for abdominal distention, abdominal pain, constipation, diarrhea, nausea and vomiting.   Genitourinary:  Negative for difficulty urinating, dysuria, flank pain and frequency.    Skin:  Negative for color change, rash and wound.   Neurological:  Positive for dizziness and headaches.   Psychiatric/Behavioral:  Positive for dysphoric mood. The patient is nervous/anxious.    All other systems reviewed and are negative.    Patient Active Problem List   Diagnosis    Chest pain on breathing    Metabolic acidosis    Bipolar affective disorder    Obesity    Microcytic anemia    Hypokalemia    Hypertriglyceridemia    Systolic murmur    NSAID long-term use    Hypertension    Beta thalassemia minor       Objective:      Physical Exam  Vitals and nursing note reviewed.   Constitutional:       General: She is not in acute distress.     Appearance: Normal appearance. She is well-developed.   HENT:      Head: Normocephalic.      Nose: Nose normal.   Eyes:      Conjunctiva/sclera: Conjunctivae normal.      Pupils: Pupils are equal, round, and reactive to light.   Cardiovascular:      Rate and Rhythm: Normal rate and regular rhythm.      Heart sounds: Normal heart sounds.   Pulmonary:      Effort: Pulmonary effort is normal. No respiratory distress.      Breath sounds: Normal breath sounds.   Musculoskeletal:      Cervical back: Normal range of motion and neck supple.   Skin:     General: Skin is warm and dry.      Findings: No rash.   Neurological:      Mental Status: She is alert and oriented to person, place, and time.   Psychiatric:         Behavior: Behavior normal.       Lab Results   Component Value Date    WBC 10.48 03/14/2023    HGB 10.6 (L) 03/14/2023    HCT 35.6 (L) 03/14/2023     03/14/2023    CHOL 194 12/02/2022    TRIG 115 12/02/2022    HDL 51 12/02/2022     (H) 03/29/2023    AST 62 (H) 03/29/2023     03/29/2023    K 4.3 03/29/2023     03/29/2023    CREATININE 0.8 03/29/2023    BUN 9 03/29/2023    CO2 26 03/29/2023    TSH 2.949 03/29/2023    INR 0.9 11/06/2019    HGBA1C 5.5 06/13/2016     The 10-year ASCVD risk score (Graham DAVIES, et al., 2019) is: 2.4%    Values used  "to calculate the score:      Age: 53 years      Sex: Female      Is Non- : No      Diabetic: No      Tobacco smoker: No      Systolic Blood Pressure: 142 mmHg      Is BP treated: Yes      HDL Cholesterol: 51 mg/dL      Total Cholesterol: 194 mg/dL  Visit Vitals  BP (!) 142/96 (BP Location: Right arm, Patient Position: Sitting, BP Method: Medium (Manual))   Pulse 95   Temp 98.9 °F (37.2 °C) (Temporal)   Ht 5' 1.5" (1.562 m)   Wt 95 kg (209 lb 7 oz)   SpO2 97%   BMI 38.93 kg/m²      Assessment:       1. Palpitations    2. Dizziness    3. Primary hypertension    4. Anxiety        Plan:       1. Palpitations/Dizziness  -     Ambulatory referral/consult to Cardiology; Future; Expected date: 04/05/2023  -     Cardiac Monitor - 3-15 Day Adult ([facility] Only); Future  -     Comprehensive metabolic panel; Future; Expected date: 03/29/2023  -     TSH; Future; Expected date: 03/29/2023    2. Primary hypertension   - Continue Lisinopril/HCTZ   - Low sodium diet    3. Anxiety   - Uncontrolled- Continue Vistaril PRN     Follow up if symptoms worsen or fail to improve.      Future Appointments       Date Provider Specialty Appt Notes    3/30/2023 Mani Robledo MD Cardiology R00.2]    4/17/2023 Christie Jiménez NP Family Medicine f/u 1 month htn             "

## 2023-03-29 NOTE — PROGRESS NOTES
Patient here for B/P check, she has appointment with Christie at 1 pm, patient heart feels like racing, fatigued quick and head pressure

## 2023-03-30 ENCOUNTER — OFFICE VISIT (OUTPATIENT)
Dept: CARDIOLOGY | Facility: CLINIC | Age: 53
End: 2023-03-30
Payer: COMMERCIAL

## 2023-03-30 VITALS
BODY MASS INDEX: 38.46 KG/M2 | HEART RATE: 92 BPM | HEIGHT: 62 IN | OXYGEN SATURATION: 99 % | RESPIRATION RATE: 16 BRPM | WEIGHT: 209 LBS | DIASTOLIC BLOOD PRESSURE: 84 MMHG | SYSTOLIC BLOOD PRESSURE: 144 MMHG

## 2023-03-30 DIAGNOSIS — R00.2 PALPITATIONS: ICD-10-CM

## 2023-03-30 DIAGNOSIS — R79.89 ABNORMAL LFTS: ICD-10-CM

## 2023-03-30 DIAGNOSIS — I10 PRIMARY HYPERTENSION: ICD-10-CM

## 2023-03-30 DIAGNOSIS — E78.1 HYPERTRIGLYCERIDEMIA: ICD-10-CM

## 2023-03-30 DIAGNOSIS — R74.8 ELEVATED LIVER ENZYMES: Primary | ICD-10-CM

## 2023-03-30 DIAGNOSIS — R06.02 SHORTNESS OF BREATH: ICD-10-CM

## 2023-03-30 DIAGNOSIS — E66.01 CLASS 2 SEVERE OBESITY DUE TO EXCESS CALORIES WITH SERIOUS COMORBIDITY IN ADULT, UNSPECIFIED BMI: ICD-10-CM

## 2023-03-30 PROCEDURE — 3008F BODY MASS INDEX DOCD: CPT | Mod: CPTII,S$GLB,, | Performed by: INTERNAL MEDICINE

## 2023-03-30 PROCEDURE — 3077F SYST BP >= 140 MM HG: CPT | Mod: CPTII,S$GLB,, | Performed by: INTERNAL MEDICINE

## 2023-03-30 PROCEDURE — 1159F MED LIST DOCD IN RCRD: CPT | Mod: CPTII,S$GLB,, | Performed by: INTERNAL MEDICINE

## 2023-03-30 PROCEDURE — 3079F PR MOST RECENT DIASTOLIC BLOOD PRESSURE 80-89 MM HG: ICD-10-PCS | Mod: CPTII,S$GLB,, | Performed by: INTERNAL MEDICINE

## 2023-03-30 PROCEDURE — 4010F ACE/ARB THERAPY RXD/TAKEN: CPT | Mod: CPTII,S$GLB,, | Performed by: INTERNAL MEDICINE

## 2023-03-30 PROCEDURE — 3008F PR BODY MASS INDEX (BMI) DOCUMENTED: ICD-10-PCS | Mod: CPTII,S$GLB,, | Performed by: INTERNAL MEDICINE

## 2023-03-30 PROCEDURE — 99999 PR PBB SHADOW E&M-EST. PATIENT-LVL IV: ICD-10-PCS | Mod: PBBFAC,,, | Performed by: INTERNAL MEDICINE

## 2023-03-30 PROCEDURE — 3079F DIAST BP 80-89 MM HG: CPT | Mod: CPTII,S$GLB,, | Performed by: INTERNAL MEDICINE

## 2023-03-30 PROCEDURE — 4010F PR ACE/ARB THEARPY RXD/TAKEN: ICD-10-PCS | Mod: CPTII,S$GLB,, | Performed by: INTERNAL MEDICINE

## 2023-03-30 PROCEDURE — 99205 OFFICE O/P NEW HI 60 MIN: CPT | Mod: S$GLB,,, | Performed by: INTERNAL MEDICINE

## 2023-03-30 PROCEDURE — 1160F RVW MEDS BY RX/DR IN RCRD: CPT | Mod: CPTII,S$GLB,, | Performed by: INTERNAL MEDICINE

## 2023-03-30 PROCEDURE — 1160F PR REVIEW ALL MEDS BY PRESCRIBER/CLIN PHARMACIST DOCUMENTED: ICD-10-PCS | Mod: CPTII,S$GLB,, | Performed by: INTERNAL MEDICINE

## 2023-03-30 PROCEDURE — 93000 ELECTROCARDIOGRAM COMPLETE: CPT | Mod: S$GLB,,, | Performed by: INTERNAL MEDICINE

## 2023-03-30 PROCEDURE — 3077F PR MOST RECENT SYSTOLIC BLOOD PRESSURE >= 140 MM HG: ICD-10-PCS | Mod: CPTII,S$GLB,, | Performed by: INTERNAL MEDICINE

## 2023-03-30 PROCEDURE — 1159F PR MEDICATION LIST DOCUMENTED IN MEDICAL RECORD: ICD-10-PCS | Mod: CPTII,S$GLB,, | Performed by: INTERNAL MEDICINE

## 2023-03-30 PROCEDURE — 99205 PR OFFICE/OUTPT VISIT, NEW, LEVL V, 60-74 MIN: ICD-10-PCS | Mod: S$GLB,,, | Performed by: INTERNAL MEDICINE

## 2023-03-30 PROCEDURE — 93000 EKG 12-LEAD: ICD-10-PCS | Mod: S$GLB,,, | Performed by: INTERNAL MEDICINE

## 2023-03-30 PROCEDURE — 99999 PR PBB SHADOW E&M-EST. PATIENT-LVL IV: CPT | Mod: PBBFAC,,, | Performed by: INTERNAL MEDICINE

## 2023-03-30 RX ORDER — POTASSIUM CHLORIDE 750 MG/1
10 TABLET, EXTENDED RELEASE ORAL DAILY
Qty: 30 TABLET | Refills: 11 | Status: SHIPPED | OUTPATIENT
Start: 2023-03-30 | End: 2024-03-29

## 2023-03-30 RX ORDER — LANOLIN ALCOHOL/MO/W.PET/CERES
400 CREAM (GRAM) TOPICAL DAILY
Qty: 30 TABLET | Refills: 11 | Status: SHIPPED | OUTPATIENT
Start: 2023-03-30 | End: 2024-03-29

## 2023-03-30 RX ORDER — METOPROLOL SUCCINATE 25 MG/1
25 TABLET, EXTENDED RELEASE ORAL 2 TIMES DAILY
Qty: 60 TABLET | Refills: 11 | Status: SHIPPED | OUTPATIENT
Start: 2023-03-30 | End: 2024-03-29

## 2023-03-30 RX ORDER — METOPROLOL SUCCINATE 25 MG/1
25 TABLET, EXTENDED RELEASE ORAL DAILY
Qty: 30 TABLET | Refills: 11 | Status: SHIPPED | OUTPATIENT
Start: 2023-03-30 | End: 2023-03-30 | Stop reason: DRUGHIGH

## 2023-03-30 NOTE — ASSESSMENT & PLAN NOTE
Obesity with a BMI of 38.85 kilograms/meter squared.  Will initiate control of the blood pressure and then further pursue exercise program if the blood pressure is better controlled.

## 2023-03-30 NOTE — PROGRESS NOTES
Subjective:    Patient ID:  Alexsander Powers is a 53 y.o. female patient here for evaluation Establish Care, Hypertension (Pcp started lisinopril/hctz, she does not like the way the medication is making her feel), Edema (Right lower extremity edema), and Tachycardia (Heart rates going 110-120)      History of Present Illness:   Patient is a 53-year-old lady who has noted progressive elevations of blood pressure more recently was seen by primary care physician and start her lisinopril.  In the past 3 weeks her symptoms of headache fullness in her head as well as tiredness shortness of breath and increased heart rate is become more pronounced.  Heart rate goes up to 120 even with minimal activity and doing household things and she has stop and sit down apparently.  No occurrence of any chest discomfort no arm neck or jaw pain noted.  There is no cough or congestion no fevers chills .    She gets nauseous very easily and she has some diarrhea alternating with constipation.  Her recent blood work has shown some elevations of her liver enzymes.  She also reports edema in the right lower extremity periodically.  There is no blood in the stools, no blood in the urine and no hemoptysis noted.  Fatigue and tiredness and markedly reduced effort capacity noted.  She is history of microcytic anemia, she had hysterectomy in 2014      Review of patient's allergies indicates:   Allergen Reactions    Morphine Blisters    Phenergan [promethazine]        Past Medical History:   Diagnosis Date    Bipolar affective disorder     Elevated troponin 6/14/2016    Encounter for blood transfusion     Scoliosis     Scoliosis      Past Surgical History:   Procedure Laterality Date    ABDOMINAL SURGERY      ANKLE FRACTURE SURGERY      APPENDECTOMY      BACK SURGERY      HYSTERECTOMY       Social History     Tobacco Use    Smoking status: Never    Smokeless tobacco: Never   Substance Use Topics    Alcohol use: Yes     Comment: rarely    Drug use:  No        Review of Systems   As noted in HPI in addition     Constitutional: Negative for chills, fatigue and fever.   Eyes: No double vision, No blurred vision  Neuro:  Positive for headaches, dizziness   Respiratory: Negative for cough, positive for shortness of breath with effort.    Cardiovascular: Negative for chest pain. Negative for palpitations and leg swelling.   Gastrointestinal: Negative for abdominal pain, No melena, diarrhea, nausea and vomiting.   Genitourinary: Negative for dysuria and frequency, Negative for hematuria  Skin: Negative for bruising, Negative for edema or discoloration noted.   Endocrine: Negative for polyphagia, Negative for heat intolerance, Negative for cold intolerance  Psychiatric: Negative for depression, Negative for anxiety, Negative for memory loss  Musculoskeletal: Negative for neck pain, Negative for muscle weakness, Negative for back pain          Objective        Vitals:    03/30/23 1504   BP: (!) 144/84   Pulse: 92   Resp: 16       LIPIDS - LAST 2   Lab Results   Component Value Date    CHOL 194 12/02/2022    CHOL 172 02/07/2019    HDL 51 12/02/2022    HDL 50 02/07/2019    LDLCALC 120.0 12/02/2022    LDLCALC 84.2 02/07/2019    TRIG 115 12/02/2022    TRIG 189 (H) 02/07/2019    CHOLHDL 26.3 12/02/2022    CHOLHDL 29.1 02/07/2019       CBC - LAST 2  Lab Results   Component Value Date    WBC 10.48 03/14/2023    WBC 11.03 12/02/2022    RBC 5.72 (H) 03/14/2023    RBC 5.96 (H) 12/02/2022    HGB 10.6 (L) 03/14/2023    HGB 11.3 (L) 12/02/2022    HCT 35.6 (L) 03/14/2023    HCT 37.7 12/02/2022    MCV 62 (L) 03/14/2023    MCV 63 (L) 12/02/2022    MCH 18.5 (L) 03/14/2023    MCH 19.0 (L) 12/02/2022    MCHC 29.8 (L) 03/14/2023    MCHC 30.0 (L) 12/02/2022    RDW 17.2 (H) 03/14/2023    RDW 17.0 (H) 12/02/2022     03/14/2023     12/02/2022    MPV SEE COMMENT 03/14/2023    MPV SEE COMMENT 12/02/2022    GRAN 5.8 03/14/2023    GRAN 55.3 03/14/2023    LYMPH 3.4 03/14/2023    LYMPH  32.2 03/14/2023    MONO 0.7 03/14/2023    MONO 6.8 03/14/2023    BASO 0.04 03/14/2023    BASO 0.05 12/02/2022    NRBC 0 03/14/2023    NRBC 0 12/02/2022       CHEMISTRY & LIVER FUNCTION - LAST 2  Lab Results   Component Value Date     03/29/2023     03/14/2023    K 4.3 03/29/2023    K 4.0 03/14/2023     03/29/2023     03/14/2023    CO2 26 03/29/2023    CO2 21 (L) 03/14/2023    ANIONGAP 8 03/29/2023    ANIONGAP 13 03/14/2023    BUN 9 03/29/2023    BUN 11 03/14/2023    CREATININE 0.8 03/29/2023    CREATININE 0.8 03/14/2023     (H) 03/29/2023     03/14/2023    CALCIUM 9.9 03/29/2023    CALCIUM 9.6 03/14/2023    ALBUMIN 3.7 03/29/2023    ALBUMIN 3.9 12/02/2022    PROT 7.1 03/29/2023    PROT 7.4 12/02/2022    ALKPHOS 100 03/29/2023    ALKPHOS 99 12/02/2022     (H) 03/29/2023    ALT 48 (H) 12/02/2022    AST 62 (H) 03/29/2023    AST 30 12/02/2022    BILITOT 0.4 03/29/2023    BILITOT 0.7 12/02/2022        CARDIAC PROFILE - LAST 2  Lab Results   Component Value Date    BNP 26 11/06/2019    BNP 62 06/13/2016    TROPONINI <0.006 03/14/2023    TROPONINI <0.030 11/07/2019        COAGULATION - LAST 2  Lab Results   Component Value Date    LABPT 12.2 11/06/2019    INR 0.9 11/06/2019    INR 1.0 06/13/2016       ENDOCRINE & PSA - LAST 2  Lab Results   Component Value Date    HGBA1C 5.5 06/13/2016    TSH 2.949 03/29/2023    TSH 2.126 12/02/2022        ECHOCARDIOGRAM RESULTS  Results for orders placed during the hospital encounter of 02/07/19    STRESS TEST REPORT      CURRENT/PREVIOUS VISIT EKG  Results for orders placed or performed during the hospital encounter of 03/13/23   EKG 12-lead    Collection Time: 03/13/23 11:12 PM    Narrative    Test Reason : R07.9,    Vent. Rate : 070 BPM     Atrial Rate : 070 BPM     P-R Int : 192 ms          QRS Dur : 094 ms      QT Int : 402 ms       P-R-T Axes : 061 030 061 degrees     QTc Int : 434 ms    Normal sinus rhythm  Nonspecific ST  abnormality  Abnormal ECG  When compared with ECG of 27-AUG-2020 15:18,  Criteria for Anterior infarct are no longer Present  Minimal criteria for Inferior infarct are no longer Present  Confirmed by Rusty Robledo MD ([id number]) on 3/15/2023 2:28:43 PM    Referred By: JUDY   SELF           Confirmed By:Rusty Robledo MD     No valid procedures specified.   Results for orders placed during the hospital encounter of 02/07/19    Nuc Pharm Stress test - Radiologist interpreted    Interpretation Summary  · The patient reported nausea, flushing and light headedness during the stress test.  · There were no arrhythmias during stress.  · The EKG portion of this study is abnormal but not diagnostic.  · There was no ST segment deviation noted during stress.    03/30/2023 EKG shows normal sinus rhythm voltage criteria for left ventricular hypertrophy   Nonspecific ST T wave changes noted.          PREVIOUS STRESS TEST              PREVIOUS ANGIOGRAM        PHYSICAL EXAM    GENERAL: well built, well nourished, well-developed in no apparent distress alert and oriented.   HEENT: Normocephalic. Pupils normal and conjunctivae normal.  Mucous membranes normal, no cyanosis or icterus, trachea central,no pallor or icterus is noted..   NECK: No JVD. No bruit..   THYROID: Thyroid not enlarged. No nodules present..   CARDIAC: Regular rate and rhythm. S1 is normal.S2 is normal.  No distinct S4 gallop or murmurs appreciated.       CHEST ANATOMY: normal.   LUNGS: Clear to auscultation. No wheezing or rhonchi..   ABDOMEN: Soft no masses or organomegaly.  No abdomen pulsations or bruits.  Normal bowel sounds. No pulsations and no masses felt, No guarding or rebound.   EXTREMITIES: No cyanosis, clubbing or edema noted at this time., no calf tenderness bilaterally.   PERIPHERAL VASCULAR SYSTEM: Good palpable distal pulses.   CENTRAL NERVOUS SYSTEM: No focal motor or sensory deficits noted.   SKIN: Skin without lesions, moist, well perfused.    MUSCLE STRENGTH & TONE: No noteable weakness, atrophy or abnormal movement.     I HAVE REVIEWED :    The vital signs, nurses notes, and all the pertinent radiology and labs.        Current Outpatient Medications   Medication Instructions    albuterol (VENTOLIN HFA) 90 mcg/actuation inhaler 2 puffs, Inhalation, Every 6 hours PRN, Rescue    gabapentin (NEURONTIN) 100 mg, Oral, Nightly    hydrOXYzine pamoate (VISTARIL) 25 mg, Oral, Every 8 hours PRN    lisinopriL-hydrochlorothiazide (PRINZIDE,ZESTORETIC) 10-12.5 mg per tablet 1 tablet, Oral, Daily    omeprazole (PRILOSEC) 40 mg, Oral, Daily    prenat.vits,eduar,min-iron-folic (PRENATAL VITAMIN) Tab Use as directed          Assessment & Plan     Hypertension  Blood pressure is reasonably better controlled.  Still seem to be elevated elevated, high heart rate is also noted.  In addition to lisinopril hydrochlorothiazide 10/12.5 mg daily out encourage her to add metoprolol succinate 25 mg once a day then increase it to twice a day dosing.  In the meantime supplement potassium and magnesium to her regimen    Hypertriglyceridemia  Dyslipidemia this needs further evaluation later date.  Maintain on calorie restricted diet with cutting down carbohydrates carbohydrates    Obesity  Obesity with a BMI of 38.85 kilograms/meter squared.  Will initiate control of the blood pressure and then further pursue exercise program if the blood pressure is better controlled.    Shortness of breath  Unclear etiology.   1. Obtain a chest x-ray   2. Obtain echocardiogram for LV function assessment.    3. May consider Lexiscan to evaluate for evidence of progression of ischemic heart disease.    Abnormal LFTs  I suspect this may remove related to fatty infiltration of the liver.  May consider ultrasound of the abdomen.  And follow-up with GI if this is abnormal          No follow-ups on file.

## 2023-03-30 NOTE — ASSESSMENT & PLAN NOTE
Unclear etiology.   1. Obtain a chest x-ray   2. Obtain echocardiogram for LV function assessment.    3. May consider Lexiscan to evaluate for evidence of progression of ischemic heart disease.

## 2023-03-30 NOTE — ASSESSMENT & PLAN NOTE
Dyslipidemia this needs further evaluation later date.  Maintain on calorie restricted diet with cutting down carbohydrates carbohydrates

## 2023-03-30 NOTE — ASSESSMENT & PLAN NOTE
Blood pressure is reasonably better controlled.  Still seem to be elevated elevated, high heart rate is also noted.  In addition to lisinopril hydrochlorothiazide 10/12.5 mg daily out encourage her to add metoprolol succinate 25 mg once a day then increase it to twice a day dosing.  In the meantime supplement potassium and magnesium to her regimen

## 2023-03-30 NOTE — ASSESSMENT & PLAN NOTE
I suspect this may remove related to fatty infiltration of the liver.  May consider ultrasound of the abdomen.  And follow-up with GI if this is abnormal

## 2023-03-31 ENCOUNTER — HOSPITAL ENCOUNTER (OUTPATIENT)
Dept: RADIOLOGY | Facility: HOSPITAL | Age: 53
Discharge: HOME | End: 2023-03-31
Attending: INTERNAL MEDICINE
Payer: COMMERCIAL

## 2023-03-31 DIAGNOSIS — I10 PRIMARY HYPERTENSION: ICD-10-CM

## 2023-03-31 DIAGNOSIS — R79.89 ABNORMAL LFTS: ICD-10-CM

## 2023-03-31 DIAGNOSIS — R06.02 SHORTNESS OF BREATH: ICD-10-CM

## 2023-03-31 PROCEDURE — 76700 US EXAM ABDOM COMPLETE: CPT | Mod: TC,PO

## 2023-04-11 ENCOUNTER — TELEPHONE (OUTPATIENT)
Dept: CARDIOLOGY | Facility: CLINIC | Age: 53
End: 2023-04-11
Payer: COMMERCIAL

## 2023-04-11 ENCOUNTER — PATIENT MESSAGE (OUTPATIENT)
Dept: CARDIOLOGY | Facility: CLINIC | Age: 53
End: 2023-04-11
Payer: COMMERCIAL

## 2023-04-11 ENCOUNTER — PATIENT MESSAGE (OUTPATIENT)
Dept: ADMINISTRATIVE | Facility: HOSPITAL | Age: 53
End: 2023-04-11
Payer: COMMERCIAL

## 2023-04-11 DIAGNOSIS — R10.9 ABDOMINAL PAIN, UNSPECIFIED ABDOMINAL LOCATION: Primary | ICD-10-CM

## 2023-04-12 ENCOUNTER — PATIENT MESSAGE (OUTPATIENT)
Dept: CARDIOLOGY | Facility: CLINIC | Age: 53
End: 2023-04-12
Payer: COMMERCIAL

## 2023-04-12 ENCOUNTER — HOSPITAL ENCOUNTER (EMERGENCY)
Facility: HOSPITAL | Age: 53
Discharge: HOME | End: 2023-04-12
Attending: EMERGENCY MEDICINE
Payer: COMMERCIAL

## 2023-04-12 VITALS
WEIGHT: 198 LBS | RESPIRATION RATE: 17 BRPM | TEMPERATURE: 98 F | SYSTOLIC BLOOD PRESSURE: 133 MMHG | OXYGEN SATURATION: 100 % | DIASTOLIC BLOOD PRESSURE: 81 MMHG | HEART RATE: 73 BPM | HEIGHT: 62 IN | BODY MASS INDEX: 36.44 KG/M2

## 2023-04-12 DIAGNOSIS — N39.0 URINARY TRACT INFECTION WITHOUT HEMATURIA, SITE UNSPECIFIED: ICD-10-CM

## 2023-04-12 DIAGNOSIS — R10.9 RIGHT FLANK PAIN: Primary | ICD-10-CM

## 2023-04-12 LAB
ALBUMIN SERPL BCP-MCNC: 3.8 G/DL (ref 3.5–5.2)
ALP SERPL-CCNC: 86 U/L (ref 55–135)
ALT SERPL W/O P-5'-P-CCNC: 84 U/L (ref 10–44)
ANION GAP SERPL CALC-SCNC: 8 MMOL/L (ref 8–16)
AST SERPL-CCNC: 44 U/L (ref 10–40)
BACTERIA #/AREA URNS HPF: ABNORMAL /HPF
BASOPHILS # BLD AUTO: 0.04 K/UL (ref 0–0.2)
BASOPHILS NFR BLD: 0.4 % (ref 0–1.9)
BILIRUB SERPL-MCNC: 0.3 MG/DL (ref 0.1–1)
BILIRUB UR QL STRIP: NEGATIVE
BUN SERPL-MCNC: 12 MG/DL (ref 6–20)
CALCIUM SERPL-MCNC: 9.4 MG/DL (ref 8.7–10.5)
CHLORIDE SERPL-SCNC: 103 MMOL/L (ref 95–110)
CLARITY UR: CLEAR
CO2 SERPL-SCNC: 24 MMOL/L (ref 23–29)
COLOR UR: YELLOW
CREAT SERPL-MCNC: 0.7 MG/DL (ref 0.5–1.4)
DIFFERENTIAL METHOD: ABNORMAL
EOSINOPHIL # BLD AUTO: 0.4 K/UL (ref 0–0.5)
EOSINOPHIL NFR BLD: 4 % (ref 0–8)
ERYTHROCYTE [DISTWIDTH] IN BLOOD BY AUTOMATED COUNT: 17 % (ref 11.5–14.5)
EST. GFR  (NO RACE VARIABLE): >60 ML/MIN/1.73 M^2
GIANT PLATELETS BLD QL SMEAR: PRESENT
GLUCOSE SERPL-MCNC: 107 MG/DL (ref 70–110)
GLUCOSE UR QL STRIP: NEGATIVE
HCT VFR BLD AUTO: 36.5 % (ref 37–48.5)
HGB BLD-MCNC: 11 G/DL (ref 12–16)
HGB UR QL STRIP: NEGATIVE
IMM GRANULOCYTES # BLD AUTO: 0.03 K/UL (ref 0–0.04)
IMM GRANULOCYTES NFR BLD AUTO: 0.3 % (ref 0–0.5)
KETONES UR QL STRIP: NEGATIVE
LEUKOCYTE ESTERASE UR QL STRIP: ABNORMAL
LIPASE SERPL-CCNC: 25 U/L (ref 4–60)
LYMPHOCYTES # BLD AUTO: 2.9 K/UL (ref 1–4.8)
LYMPHOCYTES NFR BLD: 29.8 % (ref 18–48)
MCH RBC QN AUTO: 18.5 PG (ref 27–31)
MCHC RBC AUTO-ENTMCNC: 30.1 G/DL (ref 32–36)
MCV RBC AUTO: 62 FL (ref 82–98)
MICROSCOPIC COMMENT: ABNORMAL
MONOCYTES # BLD AUTO: 0.6 K/UL (ref 0.3–1)
MONOCYTES NFR BLD: 6.2 % (ref 4–15)
NEUTROPHILS # BLD AUTO: 5.8 K/UL (ref 1.8–7.7)
NEUTROPHILS NFR BLD: 59.3 % (ref 38–73)
NITRITE UR QL STRIP: NEGATIVE
NRBC BLD-RTO: 0 /100 WBC
PH UR STRIP: 6 [PH] (ref 5–8)
PLATELET # BLD AUTO: 297 K/UL (ref 150–450)
PLATELET BLD QL SMEAR: ABNORMAL
PMV BLD AUTO: ABNORMAL FL (ref 9.2–12.9)
POTASSIUM SERPL-SCNC: 3.6 MMOL/L (ref 3.5–5.1)
PROT SERPL-MCNC: 7.2 G/DL (ref 6–8.4)
PROT UR QL STRIP: NEGATIVE
RBC # BLD AUTO: 5.93 M/UL (ref 4–5.4)
RBC #/AREA URNS HPF: 1 /HPF (ref 0–4)
SODIUM SERPL-SCNC: 135 MMOL/L (ref 136–145)
SP GR UR STRIP: 1.02 (ref 1–1.03)
SQUAMOUS #/AREA URNS HPF: 4 /HPF
URN SPEC COLLECT METH UR: ABNORMAL
UROBILINOGEN UR STRIP-ACNC: NEGATIVE EU/DL
WBC # BLD AUTO: 9.74 K/UL (ref 3.9–12.7)
WBC #/AREA URNS HPF: 28 /HPF (ref 0–5)

## 2023-04-12 PROCEDURE — 63600175 PHARM REV CODE 636 W HCPCS: Performed by: EMERGENCY MEDICINE

## 2023-04-12 PROCEDURE — 99285 EMERGENCY DEPT VISIT HI MDM: CPT | Mod: 25

## 2023-04-12 PROCEDURE — 80053 COMPREHEN METABOLIC PANEL: CPT | Performed by: EMERGENCY MEDICINE

## 2023-04-12 PROCEDURE — 25000003 PHARM REV CODE 250: Performed by: EMERGENCY MEDICINE

## 2023-04-12 PROCEDURE — 36415 COLL VENOUS BLD VENIPUNCTURE: CPT | Performed by: EMERGENCY MEDICINE

## 2023-04-12 PROCEDURE — 96372 THER/PROPH/DIAG INJ SC/IM: CPT | Performed by: EMERGENCY MEDICINE

## 2023-04-12 PROCEDURE — 87389 HIV-1 AG W/HIV-1&-2 AB AG IA: CPT | Performed by: EMERGENCY MEDICINE

## 2023-04-12 PROCEDURE — 81000 URINALYSIS NONAUTO W/SCOPE: CPT | Performed by: EMERGENCY MEDICINE

## 2023-04-12 PROCEDURE — 87086 URINE CULTURE/COLONY COUNT: CPT | Performed by: EMERGENCY MEDICINE

## 2023-04-12 PROCEDURE — 83690 ASSAY OF LIPASE: CPT | Performed by: EMERGENCY MEDICINE

## 2023-04-12 PROCEDURE — 86803 HEPATITIS C AB TEST: CPT | Performed by: EMERGENCY MEDICINE

## 2023-04-12 PROCEDURE — 85025 COMPLETE CBC W/AUTO DIFF WBC: CPT | Performed by: EMERGENCY MEDICINE

## 2023-04-12 RX ORDER — ORPHENADRINE CITRATE 30 MG/ML
60 INJECTION INTRAMUSCULAR; INTRAVENOUS
Status: COMPLETED | OUTPATIENT
Start: 2023-04-12 | End: 2023-04-12

## 2023-04-12 RX ORDER — HYDROCODONE BITARTRATE AND ACETAMINOPHEN 5; 325 MG/1; MG/1
1 TABLET ORAL
Status: COMPLETED | OUTPATIENT
Start: 2023-04-12 | End: 2023-04-12

## 2023-04-12 RX ORDER — TRAMADOL HYDROCHLORIDE 50 MG/1
50 TABLET ORAL EVERY 6 HOURS PRN
Qty: 10 TABLET | Refills: 0 | Status: SHIPPED | OUTPATIENT
Start: 2023-04-12

## 2023-04-12 RX ORDER — METHOCARBAMOL 500 MG/1
1000 TABLET, FILM COATED ORAL 3 TIMES DAILY PRN
Qty: 20 TABLET | Refills: 0 | Status: SHIPPED | OUTPATIENT
Start: 2023-04-12 | End: 2023-04-17

## 2023-04-12 RX ORDER — AMOXICILLIN AND CLAVULANATE POTASSIUM 875; 125 MG/1; MG/1
1 TABLET, FILM COATED ORAL 2 TIMES DAILY
Qty: 14 TABLET | Refills: 0 | Status: SHIPPED | OUTPATIENT
Start: 2023-04-12 | End: 2023-06-14

## 2023-04-12 RX ORDER — KETOROLAC TROMETHAMINE 30 MG/ML
15 INJECTION, SOLUTION INTRAMUSCULAR; INTRAVENOUS
Status: COMPLETED | OUTPATIENT
Start: 2023-04-12 | End: 2023-04-12

## 2023-04-12 RX ORDER — HYDROMORPHONE HYDROCHLORIDE 2 MG/ML
0.5 INJECTION, SOLUTION INTRAMUSCULAR; INTRAVENOUS; SUBCUTANEOUS
Status: DISCONTINUED | OUTPATIENT
Start: 2023-04-12 | End: 2023-04-12

## 2023-04-12 RX ADMIN — HYDROCODONE BITARTRATE AND ACETAMINOPHEN 1 TABLET: 5; 325 TABLET ORAL at 05:04

## 2023-04-12 RX ADMIN — KETOROLAC TROMETHAMINE 15 MG: 30 INJECTION, SOLUTION INTRAMUSCULAR; INTRAVENOUS at 04:04

## 2023-04-12 RX ADMIN — ORPHENADRINE CITRATE 60 MG: 30 INJECTION INTRAMUSCULAR; INTRAVENOUS at 04:04

## 2023-04-12 NOTE — ED PROVIDER NOTES
"Encounter Date: 4/12/2023    SCRIBE #1 NOTE: IBethany, am scribing for, and in the presence of,  Praneeth Shields MD.     History     Chief Complaint   Patient presents with    Back Pain     Back pain radiating to the abdomen      Time seen by provider: 4:08 PM on 04/12/2023    Alexsander Powers is a 53 y.o. female with a PMHx of scoliosis and bipolar affective disorder who presents to the ED for evaluation of back pain that radiates to the R flank region and the R abdominal region since onset.  Patient describes the pain as a sensation resembling a "pulled muscle."  She references a Hx of kidney stones in the past and notes having an US performed about 2 weeks ago.  Review of external records performed:  Patient had an US resulting on 03/31/2023 showing fatty infiltration of the liver, cholelithiasis without evidence of acute cholecystitis, mildly prominent common bile duct measuring 7 mm, splenomegaly, and a 5 mm nonobstructing right renal stone.  She expresses concerns for cholecystitis, as the pain is exacerbated with laying on the R side.  The patient denies bloody stool, bladder or bowel incontinence, painful urination, bloody urine, or any other symptoms at this time.  PSHx includes hysterectomy, back surgery, appendectomy, and abdominal surgery.      The history is provided by the patient.   Review of patient's allergies indicates:   Allergen Reactions    Morphine Blisters    Phenergan [promethazine]      Past Medical History:   Diagnosis Date    Bipolar affective disorder     Elevated troponin 6/14/2016    Encounter for blood transfusion     Scoliosis     Scoliosis      Past Surgical History:   Procedure Laterality Date    ABDOMINAL SURGERY      ANKLE FRACTURE SURGERY      APPENDECTOMY      BACK SURGERY      HYSTERECTOMY       Family History   Problem Relation Age of Onset    Cancer Mother     Heart disease Father     Hypertension Father     Diabetes Father      Social History     Tobacco Use    " Smoking status: Never    Smokeless tobacco: Never   Substance Use Topics    Alcohol use: Yes     Comment: rarely    Drug use: No     Review of Systems   Constitutional:  Negative for fever.   HENT:  Negative for sore throat.    Respiratory:  Negative for shortness of breath.    Cardiovascular:  Negative for chest pain.   Gastrointestinal:  Positive for abdominal pain (R-sided). Negative for blood in stool and nausea.   Genitourinary:  Positive for flank pain (R). Negative for dysuria and hematuria.        Negative for bladder/bowel incontinence.     Musculoskeletal:  Positive for back pain.   Skin:  Negative for rash.   Neurological:  Negative for weakness.   Hematological:  Does not bruise/bleed easily.     Physical Exam     Initial Vitals [04/12/23 1555]   BP Pulse Resp Temp SpO2   133/81 85 20 98.4 °F (36.9 °C) 100 %      MAP       --         Physical Exam    Nursing note and vitals reviewed.  Constitutional: She appears well-developed and well-nourished. She is not diaphoretic. No distress.   HENT:   Head: Normocephalic and atraumatic.   Eyes: EOM are normal. Pupils are equal, round, and reactive to light.   Neck: Neck supple.   Normal range of motion.  Cardiovascular:  Normal rate, regular rhythm, normal heart sounds and intact distal pulses.     Exam reveals no gallop and no friction rub.       No murmur heard.  Pulmonary/Chest: Breath sounds normal. No respiratory distress. She has no wheezes. She has no rhonchi. She has no rales.   Abdominal: Abdomen is soft. Bowel sounds are normal. There is no abdominal tenderness. There is no rebound and no guarding.   Musculoskeletal:         General: Normal range of motion.      Cervical back: Normal range of motion and neck supple.      Thoracic back: Tenderness present.      Comments: TTP along the mid back and R CVA region.       Neurological: She is alert and oriented to person, place, and time.   Skin: Skin is warm and dry.   Psychiatric: She has a normal mood and  affect. Her behavior is normal. Judgment and thought content normal.       ED Course   Procedures  Labs Reviewed   URINALYSIS, REFLEX TO URINE CULTURE - Abnormal; Notable for the following components:       Result Value    Leukocytes, UA 1+ (*)     All other components within normal limits    Narrative:     Specimen Source->Urine   COMPREHENSIVE METABOLIC PANEL - Abnormal; Notable for the following components:    Sodium 135 (*)     AST 44 (*)     ALT 84 (*)     All other components within normal limits   CBC W/ AUTO DIFFERENTIAL - Abnormal; Notable for the following components:    RBC 5.93 (*)     Hemoglobin 11.0 (*)     Hematocrit 36.5 (*)     MCV 62 (*)     MCH 18.5 (*)     MCHC 30.1 (*)     RDW 17.0 (*)     All other components within normal limits   URINALYSIS MICROSCOPIC - Abnormal; Notable for the following components:    WBC, UA 28 (*)     Bacteria Few (*)     All other components within normal limits    Narrative:     Specimen Source->Urine   CULTURE, URINE   LIPASE   HIV 1 / 2 ANTIBODY   HEPATITIS C ANTIBODY          Imaging Results              CT Renal Stone Study ABD Pelvis WO (Final result)  Result time 04/12/23 17:45:03      Final result by Nora Alexander MD (04/12/23 17:45:03)                   Impression:      No evidence of acute abnormality in the abdomen or pelvis.  No dense nephrolithiasis or obstructive uropathy.    Punctate vascular calcifications and parenchymal calcifications in the right kidney.    Hepatic steatosis.    Splenomegaly.    No CT evidence of cholecystitis or gallbladder distension/biliary dilatation noting gallstones are better visualized on recent ultrasound.    Midline abdominal wall hernia in the pelvis containing fat has enlarged since prior CT 02/09/2018.    Please see above discussion for additional incidental nonacute findings.      Electronically signed by: Nora Alexander  Date:    04/12/2023  Time:    17:45               Narrative:    EXAMINATION:  CT RENAL STONE  STUDY ABD PELVIS WO    CLINICAL HISTORY:  Flank pain, kidney stone suspected;    TECHNIQUE:  Low dose axial images, sagittal and coronal reformations were obtained from the lung bases to the pubic symphysis without oral or IV contrast    COMPARISON:  03/31/2023 ultrasound abdomen, CT abdomen pelvis contrast 05/09/2018    FINDINGS:  Abdomen:    - Lung bases: Lung bases appear clear.  There is no evidence of pleural or pericardial effusion or cardiomegaly.    The lack of IV contrast limits evaluation of the solid organs for focal lesions.    - Liver: Liver is decreased in density compatible with hepatic steatosis.  No appreciable focal hepatic lesions are noted as imaged.    - Gallbladder: Known gallstones in the gallbladder as seen on recent ultrasound are not very dense and not well visualized on CT.  There is no CT evidence of cholecystitis.    - Bile Ducts: No evidence of intra or extra hepatic biliary ductal dilation.    - Spleen: Spleen is enlarged.  No focal lesions are visualized as imaged.    - Kidneys: There is punctate high density in the cortex of the right kidney inferolaterally suggesting calcification.  No visible calculi are seen in the collecting system.  Punctate calcification appears vascular abutting the cortex along the upper pole.  Ureters not distended but is not reliably followed all the way down to the bladder with is    - Adrenals: Unremarkable.    - Pancreas: No mass or peripancreatic fat stranding.    - Retroperitoneum:  No significant adenopathy.    - Vascular: No abdominal aortic aneurysm.  Mild atherosclerosis in the aorta and iliac arteries noted.    - Abdominal wall:  There is a midline pelvic fat containing umbilical hernia appearing enlarged since the previous CT 05/09/2018.  No bowel is seen within this herniated fat but a single loop of bowel is near the wall defect.  There is no evidence of acute inflammation or free fluid along the hernia.    Pelvis:    Bladder is mostly  collapsed and unremarkable as imaged.  The uterus is surgically absent.  No adnexal masses are seen.  High density along the ovaries suggests calcification or postoperative changes.  No ovarian cystic or solid lesions appreciated.  There is no free fluid or adenopathy in the pelvis    Bowel/Mesentery:    Stomach is unremarkable.  Small bowel is nondistended.  Appendix appears normal and there is no evidence of bowel mucosal or mesenteric inflammation.  There is no extraluminal free air.    Bones:  No acute osseous abnormality and no suspicious lytic or blastic lesion.  Spondylosis of the spine and postoperative changes of posterior fusion and decompression with posterior rods bilaterally spanned L 2 through L5.  Anterolisthesis of L5 relative to S1 is again noted and findings are stable.                                       Medications   ketorolac injection 15 mg (15 mg Intramuscular Given by Other 4/12/23 1630)   orphenadrine injection 60 mg (60 mg Intramuscular Given by Other 4/12/23 1631)   HYDROcodone-acetaminophen 5-325 mg per tablet 1 tablet (1 tablet Oral Given 4/12/23 2238)     Medical Decision Making:   History:   Old Medical Records: I decided to obtain old medical records.  Initial Assessment:   53-year-old female presented with right flank/mid back pain.  Differential Diagnosis:   Initial differential diagnosis included but not limited to musculoskeletal pain, nephrolithiasis, and pyelonephritis.  Clinical Tests:   Lab Tests: Ordered and Reviewed  Radiological Study: Ordered and Reviewed  ED Management:  The patient was emergently evaluated in the emergency department, her evaluation was significant for a middle-age female with tenderness along the right CVA region.  The patient was initially treated with a dose of parental Toradol and parental Norflex, without improvement in her symptoms.  The patient does have a known kidney stone in that kidney on the right along with gallstones as well on recent  ultrasound.  I then expanded my workup and the patient's labs were significant for infection in her urine.  A renal CT scan showed no acute abnormalities per Radiology.  The patient was also treated with p.o. the Norco, and refused any IV medications at present.  The etiology of the patient's pain is likely multifactorial and wall muscle pain along with the infection in her urine.  The patient is stable for discharge home and does not require any further workup at this time.  She will be discharged home with p.o. Augmentin, p.o. Ultram, and p.o. Robaxin.  She is referred to primary care for follow-up.        Scribe Attestation:   Scribe #1: I performed the above scribed service and the documentation accurately describes the services I performed. I attest to the accuracy of the note.              I, Dr. Praneeth Shields, personally performed the services described in this documentation. All medical record entries made by the scribe were at my direction and in my presence.  I have reviewed the chart and agree that the record reflects my personal performance and is accurate and complete. Praneeth Shields MD.  6:35 PM 04/12/2023       Clinical Impression:   Final diagnoses:  [R10.9] Right flank pain (Primary)  [N39.0] Urinary tract infection without hematuria, site unspecified        ED Disposition Condition    Discharge Stable          ED Prescriptions       Medication Sig Dispense Start Date End Date Auth. Provider    traMADoL (ULTRAM) 50 mg tablet Take 1 tablet (50 mg total) by mouth every 6 (six) hours as needed for Pain. 10 tablet 4/12/2023 -- Praneeth Shields MD    methocarbamoL (ROBAXIN) 500 MG Tab Take 2 tablets (1,000 mg total) by mouth 3 (three) times daily as needed (pain). 20 tablet 4/12/2023 4/17/2023 Praneeth Shields MD    amoxicillin-clavulanate 875-125mg (AUGMENTIN) 875-125 mg per tablet Take 1 tablet by mouth 2 (two) times daily. 14 tablet 4/12/2023 -- Praneeth Shields MD          Follow-up  Information       Follow up With Specialties Details Why Contact Info    Hamida Gunn MD Family Medicine Schedule an appointment as soon as possible for a visit   [address] [facility] [address]  [place] [address]  [phone]               Praneeth Shields MD  04/12/23 3112

## 2023-04-12 NOTE — ED NOTES
Pt reports right sided thoracic back pain described as twisting pain radiating to right side. Pt denies trauma or injury. Pt has hx of back surgery. Pt denies chest pain, dizziness.

## 2023-04-13 LAB
HCV AB SERPL QL IA: NORMAL
HIV 1+2 AB+HIV1 P24 AG SERPL QL IA: NORMAL

## 2023-04-14 LAB — BACTERIA UR CULT: NORMAL

## 2023-04-15 DIAGNOSIS — I10 ESSENTIAL HYPERTENSION: ICD-10-CM

## 2023-04-15 DIAGNOSIS — K21.9 GASTROESOPHAGEAL REFLUX DISEASE WITHOUT ESOPHAGITIS: ICD-10-CM

## 2023-04-17 ENCOUNTER — TELEPHONE (OUTPATIENT)
Dept: FAMILY MEDICINE | Facility: CLINIC | Age: 53
End: 2023-04-17
Payer: COMMERCIAL

## 2023-04-17 RX ORDER — OMEPRAZOLE 40 MG/1
40 CAPSULE, DELAYED RELEASE ORAL DAILY
Qty: 30 CAPSULE | Refills: 3 | OUTPATIENT
Start: 2023-04-17

## 2023-04-17 RX ORDER — LISINOPRIL AND HYDROCHLOROTHIAZIDE 10; 12.5 MG/1; MG/1
1 TABLET ORAL DAILY
Qty: 30 TABLET | Refills: 1 | OUTPATIENT
Start: 2023-04-17

## 2023-04-17 NOTE — TELEPHONE ENCOUNTER
LOV: 3/29/23 Jessy    NOV: 4/17/23 Jessy     Preffered Pharmacy:   [facility] - [place], [place] - [address]

## 2023-04-18 RX ORDER — LISINOPRIL AND HYDROCHLOROTHIAZIDE 10; 12.5 MG/1; MG/1
1 TABLET ORAL DAILY
Qty: 30 TABLET | Refills: 1 | Status: SHIPPED | OUTPATIENT
Start: 2023-04-18

## 2023-04-18 RX ORDER — OMEPRAZOLE 40 MG/1
40 CAPSULE, DELAYED RELEASE ORAL DAILY
Qty: 30 CAPSULE | Refills: 3 | Status: SHIPPED | OUTPATIENT
Start: 2023-04-18

## 2023-04-25 ENCOUNTER — OFFICE VISIT (OUTPATIENT)
Dept: GASTROENTEROLOGY | Facility: CLINIC | Age: 53
End: 2023-04-25
Payer: COMMERCIAL

## 2023-04-25 ENCOUNTER — TELEPHONE (OUTPATIENT)
Dept: SURGERY | Facility: CLINIC | Age: 53
End: 2023-04-25
Payer: COMMERCIAL

## 2023-04-25 VITALS
BODY MASS INDEX: 38.67 KG/M2 | HEART RATE: 89 BPM | SYSTOLIC BLOOD PRESSURE: 136 MMHG | WEIGHT: 210.13 LBS | DIASTOLIC BLOOD PRESSURE: 88 MMHG | HEIGHT: 62 IN

## 2023-04-25 DIAGNOSIS — R74.8 ELEVATED LIVER ENZYMES: ICD-10-CM

## 2023-04-25 DIAGNOSIS — K76.0 FATTY LIVER: ICD-10-CM

## 2023-04-25 DIAGNOSIS — K21.9 GASTROESOPHAGEAL REFLUX DISEASE, UNSPECIFIED WHETHER ESOPHAGITIS PRESENT: ICD-10-CM

## 2023-04-25 DIAGNOSIS — R19.8 IRREGULAR BOWEL HABITS: ICD-10-CM

## 2023-04-25 DIAGNOSIS — D50.9 MICROCYTIC ANEMIA: ICD-10-CM

## 2023-04-25 DIAGNOSIS — R11.0 NAUSEA: ICD-10-CM

## 2023-04-25 DIAGNOSIS — K80.20 GALLSTONES: ICD-10-CM

## 2023-04-25 DIAGNOSIS — K83.8 DILATED BILE DUCT: ICD-10-CM

## 2023-04-25 DIAGNOSIS — R10.10 PAIN OF UPPER ABDOMEN: Primary | ICD-10-CM

## 2023-04-25 DIAGNOSIS — K46.9 ABDOMINAL HERNIA WITHOUT OBSTRUCTION AND WITHOUT GANGRENE, RECURRENCE NOT SPECIFIED, UNSPECIFIED HERNIA TYPE: ICD-10-CM

## 2023-04-25 DIAGNOSIS — R19.8 ALTERNATING CONSTIPATION AND DIARRHEA: ICD-10-CM

## 2023-04-25 PROCEDURE — 1159F MED LIST DOCD IN RCRD: CPT | Mod: CPTII,S$GLB,,

## 2023-04-25 PROCEDURE — 3075F SYST BP GE 130 - 139MM HG: CPT | Mod: CPTII,S$GLB,,

## 2023-04-25 PROCEDURE — 3008F BODY MASS INDEX DOCD: CPT | Mod: CPTII,S$GLB,,

## 2023-04-25 PROCEDURE — 4010F PR ACE/ARB THEARPY RXD/TAKEN: ICD-10-PCS | Mod: CPTII,S$GLB,,

## 2023-04-25 PROCEDURE — 3075F PR MOST RECENT SYSTOLIC BLOOD PRESS GE 130-139MM HG: ICD-10-PCS | Mod: CPTII,S$GLB,,

## 2023-04-25 PROCEDURE — 3008F PR BODY MASS INDEX (BMI) DOCUMENTED: ICD-10-PCS | Mod: CPTII,S$GLB,,

## 2023-04-25 PROCEDURE — 99999 PR PBB SHADOW E&M-EST. PATIENT-LVL V: ICD-10-PCS | Mod: PBBFAC,,,

## 2023-04-25 PROCEDURE — 99204 OFFICE O/P NEW MOD 45 MIN: CPT | Mod: S$GLB,,,

## 2023-04-25 PROCEDURE — 99204 PR OFFICE/OUTPT VISIT, NEW, LEVL IV, 45-59 MIN: ICD-10-PCS | Mod: S$GLB,,,

## 2023-04-25 PROCEDURE — 3079F DIAST BP 80-89 MM HG: CPT | Mod: CPTII,S$GLB,,

## 2023-04-25 PROCEDURE — 3079F PR MOST RECENT DIASTOLIC BLOOD PRESSURE 80-89 MM HG: ICD-10-PCS | Mod: CPTII,S$GLB,,

## 2023-04-25 PROCEDURE — 1159F PR MEDICATION LIST DOCUMENTED IN MEDICAL RECORD: ICD-10-PCS | Mod: CPTII,S$GLB,,

## 2023-04-25 PROCEDURE — 1160F PR REVIEW ALL MEDS BY PRESCRIBER/CLIN PHARMACIST DOCUMENTED: ICD-10-PCS | Mod: CPTII,S$GLB,,

## 2023-04-25 PROCEDURE — 4010F ACE/ARB THERAPY RXD/TAKEN: CPT | Mod: CPTII,S$GLB,,

## 2023-04-25 PROCEDURE — 1160F RVW MEDS BY RX/DR IN RCRD: CPT | Mod: CPTII,S$GLB,,

## 2023-04-25 PROCEDURE — 99999 PR PBB SHADOW E&M-EST. PATIENT-LVL V: CPT | Mod: PBBFAC,,,

## 2023-04-25 NOTE — TELEPHONE ENCOUNTER
[facility] referral scheduled with patient: 4/27/2023 0900  [facility].

## 2023-04-25 NOTE — PROGRESS NOTES
Subjective:       Patient ID: Alexsander Powers is a 53 y.o. female Body mass index is 39.05 kg/m².    Chief Complaint: Abdominal Pain    This patient is new to me.  Referring Provider: Gita Gusman for abdominal pain.     Abdominal Pain  This is a new problem. The current episode started 1 to 4 weeks ago. The onset quality is sudden. The problem occurs constantly. The problem has been waxing and waning. The pain is located in the RUQ, right flank and epigastric region. The pain is at a severity of 5/10. The pain is moderate. The quality of the pain is dull and aching. The abdominal pain radiates to the back. Associated symptoms include arthralgias, constipation (On days when she feels constipated she feels like she has to multiple small volume stools to feel empty), diarrhea (Stools fluctuate between diarrhea and constipation; she reports having typically 2-3 BMs daily, but 8-10 BMs when diarrheal episodes are present; rated stool 5 on [place] scale typically in 7 when diarrhea is present), frequency (attributes to diuretic use - recommend follow-up with PCP) and nausea (started 3 days ago; no known triggers). Pertinent negatives include no anorexia, belching, dysuria, fever, flatus, headaches, hematochezia, hematuria, melena, myalgias, vomiting or weight loss. The pain is aggravated by eating. The pain is relieved by Certain positions (laying). She has tried proton pump inhibitors for the symptoms. The treatment provided no relief. Her past medical history is significant for abdominal surgery (S/p appendectomy, C-sections, and s/p hysterectomy), gallstones and GERD (Improved after starting omeprazole 40 mg once daily). There is no history of colon cancer, Crohn's disease, irritable bowel syndrome, pancreatitis, PUD or ulcerative colitis. Patient's medical history includes UTI. Patient's medical history does not include kidney stones.     Review of Systems   Constitutional:  Negative for activity change, appetite  change, chills, diaphoresis, fatigue, fever, unexpected weight change and weight loss.   HENT:  Negative for sore throat and trouble swallowing.    Respiratory:  Negative for cough, choking and shortness of breath.    Cardiovascular:  Negative for chest pain.   Gastrointestinal:  Positive for abdominal pain, constipation (On days when she feels constipated she feels like she has to multiple small volume stools to feel empty), diarrhea (Stools fluctuate between diarrhea and constipation; she reports having typically 2-3 BMs daily, but 8-10 BMs when diarrheal episodes are present; rated stool 5 on [place] scale typically in 7 when diarrhea is present) and nausea (started 3 days ago; no known triggers). Negative for abdominal distention, anal bleeding, anorexia, blood in stool, flatus, hematochezia, melena, rectal pain and vomiting.   Genitourinary:  Positive for frequency (attributes to diuretic use - recommend follow-up with PCP). Negative for dysuria and hematuria.   Musculoskeletal:  Positive for arthralgias. Negative for myalgias.   Neurological:  Negative for headaches.       No LMP recorded. Patient has had a hysterectomy.  Past Medical History:   Diagnosis Date    Bipolar affective disorder     Elevated troponin 6/14/2016    Encounter for blood transfusion     Scoliosis     Scoliosis      Past Surgical History:   Procedure Laterality Date    ABDOMINAL SURGERY      ANKLE FRACTURE SURGERY      APPENDECTOMY      BACK SURGERY      HYSTERECTOMY       Family History   Problem Relation Age of Onset    Cancer Mother     Heart disease Father     Hypertension Father     Diabetes Father     Colon cancer Neg Hx     Colon polyps Neg Hx     Esophageal cancer Neg Hx     Stomach cancer Neg Hx      Social History     Tobacco Use    Smoking status: Never    Smokeless tobacco: Never   Substance Use Topics    Alcohol use: Yes     Comment: rarely    Drug use: No     Wt Readings from Last 10 Encounters:   04/25/23 95.3 kg (210 lb  1.6 oz)   04/12/23 89.8 kg (198 lb)   03/30/23 94.8 kg (209 lb)   03/29/23 95 kg (209 lb 7 oz)   03/15/23 93.8 kg (206 lb 12.7 oz)   03/13/23 94.8 kg (209 lb)   12/02/22 94.8 kg (208 lb 15.9 oz)   04/29/21 89.8 kg (198 lb)   10/30/20 91 kg (200 lb 9.9 oz)   10/16/20 90.2 kg (198 lb 15.4 oz)     Lab Results   Component Value Date    WBC 9.74 04/12/2023    HGB 11.0 (L) 04/12/2023    HCT 36.5 (L) 04/12/2023    MCV 62 (L) 04/12/2023     04/12/2023     CMP  Sodium   Date Value Ref Range Status   04/12/2023 135 (L) 136 - 145 mmol/L Final     Potassium   Date Value Ref Range Status   04/12/2023 3.6 3.5 - 5.1 mmol/L Final     Chloride   Date Value Ref Range Status   04/12/2023 103 95 - 110 mmol/L Final     CO2   Date Value Ref Range Status   04/12/2023 24 23 - 29 mmol/L Final     Glucose   Date Value Ref Range Status   04/12/2023 107 70 - 110 mg/dL Final     BUN   Date Value Ref Range Status   04/12/2023 12 6 - 20 mg/dL Final     Creatinine   Date Value Ref Range Status   04/12/2023 0.7 0.5 - 1.4 mg/dL Final     Calcium   Date Value Ref Range Status   04/12/2023 9.4 8.7 - 10.5 mg/dL Final     Total Protein   Date Value Ref Range Status   04/12/2023 7.2 6.0 - 8.4 g/dL Final     Albumin   Date Value Ref Range Status   04/12/2023 3.8 3.5 - 5.2 g/dL Final     Total Bilirubin   Date Value Ref Range Status   04/12/2023 0.3 0.1 - 1.0 mg/dL Final     Comment:     For infants and newborns, interpretation of results should be based  on gestational age, weight and in agreement with clinical  observations.    Premature Infant recommended reference ranges:  Up to 24 hours.............<8.0 mg/dL  Up to 48 hours............<12.0 mg/dL  3-5 days..................<15.0 mg/dL  6-29 days.................<15.0 mg/dL       Alkaline Phosphatase   Date Value Ref Range Status   04/12/2023 86 55 - 135 U/L Final     AST   Date Value Ref Range Status   04/12/2023 44 (H) 10 - 40 U/L Final     ALT   Date Value Ref Range Status   04/12/2023 84  (H) 10 - 44 U/L Final     Anion Gap   Date Value Ref Range Status   04/12/2023 8 8 - 16 mmol/L Final     eGFR if    Date Value Ref Range Status   04/29/2021 >60 >60 mL/min/1.73 m^2 Final     eGFR if non    Date Value Ref Range Status   04/29/2021 >60 >60 mL/min/1.73 m^2 Final     Comment:     Calculation used to obtain the estimated glomerular filtration  rate (eGFR) is the CKD-EPI equation.        Lab Results   Component Value Date    LIPASE 25 04/12/2023     Lab Results   Component Value Date    TSH 2.949 03/29/2023       Reviewed prior medical records including radiology report of CT renal stone study 04/12/2023, abdominal ultrasound 03/31/2023, chest x-ray 03/14/2023 & endoscopy history (see surgical history).    Objective:      Physical Exam  Vitals and nursing note reviewed.   Constitutional:       General: She is not in acute distress.     Appearance: Normal appearance. She is obese. She is not ill-appearing.   HENT:      Mouth/Throat:      Lips: Pink. No lesions.   Cardiovascular:      Rate and Rhythm: Normal rate.      Pulses: Normal pulses.   Pulmonary:      Effort: Pulmonary effort is normal. No respiratory distress.   Abdominal:      General: Abdomen is protuberant. Bowel sounds are normal. There is no distension or abdominal bruit. There are no signs of injury.      Palpations: Abdomen is soft. There is no shifting dullness, fluid wave, hepatomegaly, splenomegaly or mass.      Tenderness: There is abdominal tenderness in the right upper quadrant and epigastric area. There is no guarding or rebound. Negative signs include Dodd's sign, Rovsing's sign and McBurney's sign.      Hernia: A hernia is present. Hernia is present in the umbilical area.   Skin:     General: Skin is warm and dry.      Coloration: Skin is not jaundiced or pale.   Neurological:      Mental Status: She is alert and oriented to person, place, and time.   Psychiatric:         Attention and Perception:  Attention normal.         Mood and Affect: Mood normal.         Speech: Speech normal.         Behavior: Behavior normal.       Assessment:       1. Pain of upper abdomen    2. Gallstones    3. Alternating constipation and diarrhea    4. Irregular bowel habits    5. Dilated bile duct    6. Gastroesophageal reflux disease, unspecified whether esophagitis present    7. Nausea    8. Fatty liver    9. Elevated liver enzymes    10. Abdominal hernia without obstruction and without gangrene, recurrence not specified, unspecified hernia type    11. Microcytic anemia        Plan:       Pain of upper abdomen  - schedule EGD, discussed procedure with patient, including risks and benefits, patient verbalized understanding  -continue omeprazole 40 mg once daily 30 minutes to an hour before breakfast  -     Hepatitis Panel, Acute; Future; Expected date: 04/25/2023  -     Case Request Endoscopy: EGD (ESOPHAGOGASTRODUODENOSCOPY)  -     Case Request Endoscopy: COLONOSCOPY    Gallstones  - recommend low fat diet  -     Ambulatory referral/consult to General Surgery; Future; Expected date: 05/02/2023    Alternating constipation and diarrhea & Irregular bowel habits   - schedule Colonoscopy, discussed procedure with the patient, including risks and benefits, patient verbalized understanding   -Recommend high fiber diet (20-30 grams of fiber daily)/OTC fiber supplements daily as directed.  - recommend OTC probiotic, such as Florastor or Culturelle, taken as directed on packaging  - avoid lactose, alcohol, & caffeine  - avoid known triggers  -     WBC, Stool; Future; Expected date: 04/25/2023  -     Rotavirus antigen, stool; Future; Expected date: 04/25/2023  -     Adenovirus Molecular Detection, PCR, Non-Blood Stool; Future; Expected date: 04/25/2023  -     Giardia / Cryptosporidum, EIA; Future; Expected date: 04/25/2023  -     Stool Exam-Ova,Cysts,Parasites; Future; Expected date: 04/25/2023  -     Clostridium difficile EIA; Future;  Expected date: 04/25/2023  -     Stool culture; Future; Expected date: 04/25/2023  -     Case Request Endoscopy: COLONOSCOPY    Dilated bile duct  -     MRI MRCP Without Contrast; Future; Expected date: 04/25/2023    Gastroesophageal reflux disease, unspecified whether esophagitis present  - schedule EGD, discussed procedure with patient, including risks and benefits, patient verbalized understanding  - avoid large meals, avoid eating within 2-3 hours of bedtime (avoid late night eating & lying down soon after eating), elevate head of bed if nocturnal symptoms are present, smoking cessation (if current smoker), & weight loss (if overweight).   -avoid known foods which trigger reflux symptoms & to minimize/avoid high-fat foods, chocolate, caffeine, citrus, alcohol, & tomato products.  -avoid/limit use of NSAID's, since they can cause GI upset, bleeding, and/or ulcers. If needed, take with food.   -continue omeprazole 40 mg once daily 30 minutes to an hour before breakfast  -     Case Request Endoscopy: EGD (ESOPHAGOGASTRODUODENOSCOPY)    Nausea  - schedule EGD, discussed procedure with patient, including risks and benefits, patient verbalized understanding  -consider GES  -continue omeprazole 40 mg once daily 30 minutes to an hour before breakfast    Fatty liver & Elevated liver enzymes  -For fatty liver recommend: low fat, low cholesterol diet, maintain good control of blood sugars and cholesterol levels, exercise, weight loss (if overweight), minimize/avoid alcohol and tylenol products  -     Ambulatory referral/consult to Hepatology; Future; Expected date: 05/02/2023  -     Hepatitis Panel, Acute; Future; Expected date: 04/25/2023    Abdominal hernia without obstruction and without gangrene, recurrence not specified, unspecified hernia type  -     Ambulatory referral/consult to General Surgery; Future; Expected date: 05/02/2023    Microcytic anemia   -     IRON AND TIBC; Future; Expected date: 04/25/2023  -      Ferritin; Future; Expected date: 04/25/2023    Follow up in about 4 weeks (around 5/23/2023), or if symptoms worsen or fail to improve.      If no improvement in symptoms or symptoms worsen, call/follow-up at clinic or go to ER.        45 minutes of total time spent on the encounter, which includes face to face time and non-face to face time preparing to see the patient (eg, review of tests), Obtaining and/or reviewing separately obtained history, Documenting clinical information in the electronic or other health record, Independently interpreting results (not separately reported) and communicating results to the patient/family/caregiver, or Care coordination (not separately reported).     A dictation software program was used for this note. Please expect some simple typographical  errors in this note.

## 2023-04-26 ENCOUNTER — TELEPHONE (OUTPATIENT)
Dept: HEPATOLOGY | Facility: CLINIC | Age: 53
End: 2023-04-26
Payer: COMMERCIAL

## 2023-04-26 NOTE — TELEPHONE ENCOUNTER
Referral to hepatology for fatty liver, splenomegaly, elevated liver enzymes     Scheduling attempt # 1    Please call pt to schedule appt with any [facility]  Encourage video visits for new patient appts with APPs

## 2023-04-27 ENCOUNTER — TELEPHONE (OUTPATIENT)
Dept: SURGERY | Facility: CLINIC | Age: 53
End: 2023-04-27
Payer: COMMERCIAL

## 2023-04-27 NOTE — TELEPHONE ENCOUNTER
[facility] referral scheduled with patient: 4/28/2023 1000  [facility]. Patient asks about [facility] that monies due before MRI. She asks if this can be made in payments. Gave her [facility] phone number to ask her questions. She thanks for the call and information.

## 2023-04-28 ENCOUNTER — OFFICE VISIT (OUTPATIENT)
Dept: SURGERY | Facility: CLINIC | Age: 53
End: 2023-04-28
Payer: COMMERCIAL

## 2023-04-28 ENCOUNTER — LAB VISIT (OUTPATIENT)
Dept: LAB | Facility: CLINIC | Age: 53
End: 2023-04-28
Payer: COMMERCIAL

## 2023-04-28 VITALS
BODY MASS INDEX: 38.09 KG/M2 | HEART RATE: 100 BPM | WEIGHT: 207 LBS | DIASTOLIC BLOOD PRESSURE: 71 MMHG | SYSTOLIC BLOOD PRESSURE: 129 MMHG | TEMPERATURE: 98 F | HEIGHT: 62 IN

## 2023-04-28 DIAGNOSIS — R19.8 ALTERNATING CONSTIPATION AND DIARRHEA: ICD-10-CM

## 2023-04-28 DIAGNOSIS — K46.9 ABDOMINAL HERNIA WITHOUT OBSTRUCTION AND WITHOUT GANGRENE, RECURRENCE NOT SPECIFIED, UNSPECIFIED HERNIA TYPE: ICD-10-CM

## 2023-04-28 DIAGNOSIS — K80.20 GALLSTONES: ICD-10-CM

## 2023-04-28 DIAGNOSIS — D50.9 MICROCYTIC ANEMIA: ICD-10-CM

## 2023-04-28 DIAGNOSIS — R10.10 PAIN OF UPPER ABDOMEN: ICD-10-CM

## 2023-04-28 LAB
FERRITIN SERPL-MCNC: 237 NG/ML (ref 20–300)
IRON SERPL-MCNC: 108 UG/DL (ref 30–160)
RV AG STL QL IA.RAPID: NEGATIVE
SATURATED IRON: 35 % (ref 20–50)
TOTAL IRON BINDING CAPACITY: 312 UG/DL (ref 250–450)
TRANSFERRIN SERPL-MCNC: 211 MG/DL (ref 200–375)
WBC #/AREA STL HPF: ABNORMAL /[HPF]

## 2023-04-28 PROCEDURE — 99204 PR OFFICE/OUTPT VISIT, NEW, LEVL IV, 45-59 MIN: ICD-10-PCS | Mod: S$GLB,,, | Performed by: SURGERY

## 2023-04-28 PROCEDURE — 84466 ASSAY OF TRANSFERRIN: CPT

## 2023-04-28 PROCEDURE — 89055 LEUKOCYTE ASSESSMENT FECAL: CPT

## 2023-04-28 PROCEDURE — 87209 SMEAR COMPLEX STAIN: CPT

## 2023-04-28 PROCEDURE — 99999 PR PBB SHADOW E&M-EST. PATIENT-LVL III: ICD-10-PCS | Mod: PBBFAC,,, | Performed by: SURGERY

## 2023-04-28 PROCEDURE — 87798 DETECT AGENT NOS DNA AMP: CPT

## 2023-04-28 PROCEDURE — 82728 ASSAY OF FERRITIN: CPT

## 2023-04-28 PROCEDURE — 87329 GIARDIA AG IA: CPT

## 2023-04-28 PROCEDURE — 87427 SHIGA-LIKE TOXIN AG IA: CPT | Mod: 59

## 2023-04-28 PROCEDURE — 87449 NOS EACH ORGANISM AG IA: CPT

## 2023-04-28 PROCEDURE — 99999 PR PBB SHADOW E&M-EST. PATIENT-LVL III: CPT | Mod: PBBFAC,,, | Performed by: SURGERY

## 2023-04-28 PROCEDURE — 87046 STOOL CULTR AEROBIC BACT EA: CPT

## 2023-04-28 PROCEDURE — 87045 FECES CULTURE AEROBIC BACT: CPT

## 2023-04-28 PROCEDURE — 80074 ACUTE HEPATITIS PANEL: CPT

## 2023-04-28 PROCEDURE — 87425 ROTAVIRUS AG IA: CPT

## 2023-04-28 PROCEDURE — 99204 OFFICE O/P NEW MOD 45 MIN: CPT | Mod: S$GLB,,, | Performed by: SURGERY

## 2023-04-29 LAB
HAV IGM SERPL QL IA: NORMAL
HBV CORE IGM SERPL QL IA: NORMAL
HBV SURFACE AG SERPL QL IA: NORMAL
HCV AB SERPL QL IA: NORMAL

## 2023-04-30 LAB
E COLI SXT1 STL QL IA: NEGATIVE
E COLI SXT2 STL QL IA: NEGATIVE

## 2023-05-01 LAB
BACTERIA STL CULT: NORMAL
CRYPTOSP AG STL QL IA: NEGATIVE
G LAMBLIA AG STL QL IA: NEGATIVE

## 2023-05-02 LAB
HADV DNA SERPL QL NAA+PROBE: NEGATIVE
SPECIMEN SOURCE: NORMAL

## 2023-05-03 ENCOUNTER — PATIENT MESSAGE (OUTPATIENT)
Dept: GASTROENTEROLOGY | Facility: CLINIC | Age: 53
End: 2023-05-03
Payer: COMMERCIAL

## 2023-05-03 ENCOUNTER — TELEPHONE (OUTPATIENT)
Dept: CARDIOLOGY | Facility: HOSPITAL | Age: 53
End: 2023-05-03

## 2023-05-03 ENCOUNTER — HOSPITAL ENCOUNTER (OUTPATIENT)
Dept: RADIOLOGY | Facility: HOSPITAL | Age: 53
Discharge: HOME | End: 2023-05-03
Payer: COMMERCIAL

## 2023-05-03 DIAGNOSIS — K83.8 DILATED BILE DUCT: ICD-10-CM

## 2023-05-03 PROCEDURE — 74181 MRI ABDOMEN W/O CONTRAST: CPT | Mod: TC

## 2023-05-03 PROCEDURE — 76376 3D RENDER W/INTRP POSTPROCES: CPT | Mod: 26,,, | Performed by: RADIOLOGY

## 2023-05-03 PROCEDURE — 76376 MRI ABDOMEN WITHOUT CONTRAST MRCP: ICD-10-PCS | Mod: 26,,, | Performed by: RADIOLOGY

## 2023-05-03 PROCEDURE — 74181 MRI ABDOMEN WITHOUT CONTRAST MRCP: ICD-10-PCS | Mod: 26,,, | Performed by: RADIOLOGY

## 2023-05-03 PROCEDURE — 74181 MRI ABDOMEN W/O CONTRAST: CPT | Mod: 26,,, | Performed by: RADIOLOGY

## 2023-05-03 NOTE — TELEPHONE ENCOUNTER
[facility] advised[facility]   Will need to register on the first floor at the main entrance.   Patient advised that arrival time is 7:30am.  Patient advised that she may be here about 3.5-4 hours, and may want to bring something to occupy their time, as there will be periods of waiting.    Patient advised, may take her medications prior to testing if you need to.   Advised if she needs to eat to take her medications, please keep it light, like toast and juice.    Patient advised to avoid all caffeine 12 hours prior to testing.  This includes decaf tea and coffee.    Will provide peanut butter crackers for a snack after stress test.  If patient would prefer something else, please bring a snack from home.    Wear comfortable clothing.   No lotions, oils, or powders to the upper chest area. May wear deodorant.    No metal jewelry, buttons, or zippers to the upper body.  Patient verbalizes understanding of instructions.

## 2023-05-12 LAB — O+P STL MICRO: NORMAL

## 2023-05-16 NOTE — TELEPHONE ENCOUNTER
Referral to hepatology for fatty liver, splenomegaly, elevated liver enzymes      Scheduling attempt # 3 (recently cancelled 2 appts)     Please call pt to schedule appt with any [facility]  Encourage video visits for new patient appts with APPs

## 2023-05-25 ENCOUNTER — PATIENT MESSAGE (OUTPATIENT)
Dept: ADMINISTRATIVE | Facility: HOSPITAL | Age: 53
End: 2023-05-25
Payer: COMMERCIAL

## 2023-05-26 ENCOUNTER — PATIENT MESSAGE (OUTPATIENT)
Dept: FAMILY MEDICINE | Facility: CLINIC | Age: 53
End: 2023-05-26
Payer: COMMERCIAL

## 2023-05-31 DIAGNOSIS — F41.9 ANXIETY: ICD-10-CM

## 2023-05-31 RX ORDER — HYDROXYZINE PAMOATE 25 MG/1
CAPSULE ORAL
Qty: 30 CAPSULE | Refills: 0 | Status: SHIPPED | OUTPATIENT
Start: 2023-05-31

## 2023-06-14 ENCOUNTER — LAB VISIT (OUTPATIENT)
Dept: LAB | Facility: CLINIC | Age: 53
End: 2023-06-14
Payer: COMMERCIAL

## 2023-06-14 ENCOUNTER — OFFICE VISIT (OUTPATIENT)
Dept: FAMILY MEDICINE | Facility: CLINIC | Age: 53
End: 2023-06-14
Payer: COMMERCIAL

## 2023-06-14 VITALS
WEIGHT: 210.56 LBS | HEIGHT: 62 IN | OXYGEN SATURATION: 97 % | DIASTOLIC BLOOD PRESSURE: 88 MMHG | BODY MASS INDEX: 38.75 KG/M2 | HEART RATE: 84 BPM | SYSTOLIC BLOOD PRESSURE: 138 MMHG | TEMPERATURE: 99 F

## 2023-06-14 DIAGNOSIS — R74.8 ELEVATED LIVER ENZYMES: ICD-10-CM

## 2023-06-14 DIAGNOSIS — I10 PRIMARY HYPERTENSION: ICD-10-CM

## 2023-06-14 DIAGNOSIS — D64.9 ANEMIA, UNSPECIFIED TYPE: ICD-10-CM

## 2023-06-14 DIAGNOSIS — R53.83 FATIGUE, UNSPECIFIED TYPE: Primary | ICD-10-CM

## 2023-06-14 DIAGNOSIS — L91.8 SKIN TAG: ICD-10-CM

## 2023-06-14 DIAGNOSIS — R53.83 FATIGUE, UNSPECIFIED TYPE: ICD-10-CM

## 2023-06-14 LAB
ALBUMIN SERPL BCP-MCNC: 3.7 G/DL (ref 3.5–5.2)
ALP SERPL-CCNC: 65 U/L (ref 55–135)
ALT SERPL W/O P-5'-P-CCNC: 40 U/L (ref 10–44)
ANION GAP SERPL CALC-SCNC: 4 MMOL/L (ref 8–16)
AST SERPL-CCNC: 26 U/L (ref 10–40)
BASOPHILS # BLD AUTO: 0.07 K/UL (ref 0–0.2)
BASOPHILS NFR BLD: 0.8 % (ref 0–1.9)
BILIRUB SERPL-MCNC: 0.4 MG/DL (ref 0.1–1)
BUN SERPL-MCNC: 9 MG/DL (ref 6–20)
CALCIUM SERPL-MCNC: 9.1 MG/DL (ref 8.7–10.5)
CHLORIDE SERPL-SCNC: 107 MMOL/L (ref 95–110)
CO2 SERPL-SCNC: 27 MMOL/L (ref 23–29)
CREAT SERPL-MCNC: 0.7 MG/DL (ref 0.5–1.4)
DIFFERENTIAL METHOD: ABNORMAL
EOSINOPHIL # BLD AUTO: 0.4 K/UL (ref 0–0.5)
EOSINOPHIL NFR BLD: 4.5 % (ref 0–8)
ERYTHROCYTE [DISTWIDTH] IN BLOOD BY AUTOMATED COUNT: 17.8 % (ref 11.5–14.5)
EST. GFR  (NO RACE VARIABLE): >60 ML/MIN/1.73 M^2
FERRITIN SERPL-MCNC: 177 NG/ML (ref 20–300)
GLUCOSE SERPL-MCNC: 101 MG/DL (ref 70–110)
HCT VFR BLD AUTO: 34 % (ref 37–48.5)
HGB BLD-MCNC: 10.2 G/DL (ref 12–16)
IMM GRANULOCYTES # BLD AUTO: 0.05 K/UL (ref 0–0.04)
IMM GRANULOCYTES NFR BLD AUTO: 0.5 % (ref 0–0.5)
IRON SERPL-MCNC: 62 UG/DL (ref 30–160)
LYMPHOCYTES # BLD AUTO: 2 K/UL (ref 1–4.8)
LYMPHOCYTES NFR BLD: 21.8 % (ref 18–48)
MCH RBC QN AUTO: 19.3 PG (ref 27–31)
MCHC RBC AUTO-ENTMCNC: 30 G/DL (ref 32–36)
MCV RBC AUTO: 64 FL (ref 82–98)
MONOCYTES # BLD AUTO: 0.6 K/UL (ref 0.3–1)
MONOCYTES NFR BLD: 6.4 % (ref 4–15)
NEUTROPHILS # BLD AUTO: 6.1 K/UL (ref 1.8–7.7)
NEUTROPHILS NFR BLD: 66 % (ref 38–73)
NRBC BLD-RTO: 0 /100 WBC
PLATELET # BLD AUTO: 239 K/UL (ref 150–450)
PMV BLD AUTO: ABNORMAL FL (ref 9.2–12.9)
POTASSIUM SERPL-SCNC: 4.3 MMOL/L (ref 3.5–5.1)
PROT SERPL-MCNC: 6.8 G/DL (ref 6–8.4)
RBC # BLD AUTO: 5.29 M/UL (ref 4–5.4)
SATURATED IRON: 19 % (ref 20–50)
SODIUM SERPL-SCNC: 138 MMOL/L (ref 136–145)
TOTAL IRON BINDING CAPACITY: 330 UG/DL (ref 250–450)
TRANSFERRIN SERPL-MCNC: 223 MG/DL (ref 200–375)
WBC # BLD AUTO: 9.17 K/UL (ref 3.9–12.7)

## 2023-06-14 PROCEDURE — 85025 COMPLETE CBC W/AUTO DIFF WBC: CPT

## 2023-06-14 PROCEDURE — 99214 PR OFFICE/OUTPT VISIT, EST, LEVL IV, 30-39 MIN: ICD-10-PCS | Mod: ,,,

## 2023-06-14 PROCEDURE — 82728 ASSAY OF FERRITIN: CPT

## 2023-06-14 PROCEDURE — 84466 ASSAY OF TRANSFERRIN: CPT

## 2023-06-14 PROCEDURE — 99214 OFFICE O/P EST MOD 30 MIN: CPT | Mod: ,,,

## 2023-06-14 PROCEDURE — 80053 COMPREHEN METABOLIC PANEL: CPT

## 2023-06-14 NOTE — PROGRESS NOTES
Subjective:       Patient ID: Alexsander Powers is a 53 y.o. female.    Chief Complaint: Skin Tags (Between breast. Noticed it couple nights ago. C/o increased in size with irritation and redness.)    Patient presents to the clinic for with complaint of skin tag.     States she has a skin tag in between her breast. Recently the skin tag turned black. Area has been irritated.     She also reports fatigue and would like to check her blood counts.     Patient educated on plan of care, verbalized understanding.      Review of Systems   Constitutional:  Positive for fatigue. Negative for activity change, appetite change, chills, diaphoresis and fever.   HENT:  Negative for congestion, ear pain, postnasal drip, sinus pressure, sneezing and sore throat.    Eyes:  Negative for pain, discharge, redness and itching.   Respiratory:  Negative for apnea, cough, chest tightness, shortness of breath and wheezing.    Cardiovascular:  Negative for chest pain and leg swelling.   Gastrointestinal:  Negative for abdominal distention, abdominal pain, constipation, diarrhea, nausea and vomiting.   Genitourinary:  Negative for difficulty urinating, dysuria, flank pain and frequency.   Skin:  Negative for color change, rash and wound.        Skin tag in between breasts   Neurological:  Negative for dizziness.   Psychiatric/Behavioral:  The patient is nervous/anxious.    All other systems reviewed and are negative.    Patient Active Problem List   Diagnosis    Chest pain on breathing    Bipolar affective disorder    Obesity    Microcytic anemia    Hypokalemia    Hypertriglyceridemia    Systolic murmur    NSAID long-term use    Hypertension    Beta thalassemia minor    Shortness of breath    Abnormal LFTs       Objective:      Physical Exam  Vitals and nursing note reviewed.   Constitutional:       General: She is not in acute distress.     Appearance: Normal appearance. She is well-developed.   HENT:      Head: Normocephalic.      Nose: Nose  "normal.   Eyes:      Conjunctiva/sclera: Conjunctivae normal.      Pupils: Pupils are equal, round, and reactive to light.   Cardiovascular:      Rate and Rhythm: Normal rate.   Pulmonary:      Effort: Pulmonary effort is normal. No respiratory distress.   Musculoskeletal:      Cervical back: Normal range of motion.   Skin:     General: Skin is warm and dry.      Findings: No rash.             Comments: Small skin tag noted in between breast. Black in color. No redness or s/s infection.    Neurological:      Mental Status: She is alert and oriented to person, place, and time.   Psychiatric:         Behavior: Behavior normal.       Lab Results   Component Value Date    WBC 9.17 06/14/2023    HGB 10.2 (L) 06/14/2023    HCT 34.0 (L) 06/14/2023     06/14/2023    CHOL 194 12/02/2022    TRIG 115 12/02/2022    HDL 51 12/02/2022    ALT 40 06/14/2023    AST 26 06/14/2023     06/14/2023    K 4.3 06/14/2023     06/14/2023    CREATININE 0.7 06/14/2023    BUN 9 06/14/2023    CO2 27 06/14/2023    TSH 2.949 03/29/2023    INR 0.9 11/06/2019    HGBA1C 5.5 06/13/2016     The 10-year ASCVD risk score (Graham DAVIES, et al., 2019) is: 2.2%    Values used to calculate the score:      Age: 53 years      Sex: Female      Is Non- : No      Diabetic: No      Tobacco smoker: No      Systolic Blood Pressure: 138 mmHg      Is BP treated: Yes      HDL Cholesterol: 51 mg/dL      Total Cholesterol: 194 mg/dL  Visit Vitals  /88 (BP Location: Right arm, Patient Position: Sitting, BP Method: Medium (Manual))   Pulse 84   Temp 98.6 °F (37 °C) (Temporal)   Ht 5' 1.5" (1.562 m)   Wt 95.5 kg (210 lb 8.6 oz)   SpO2 97%   BMI 39.14 kg/m²      Assessment:       1. Fatigue, unspecified type    2. Anemia, unspecified type    3. Skin tag    4. Primary hypertension        Plan:       1. Fatigue, unspecified type  -     CBC auto differential; Future; Expected date: 06/14/2023  -     Iron and TIBC; Future; Expected " date: 06/14/2023  -     Ferritin; Future; Expected date: 06/14/2023    2. Anemia, unspecified type  -     Iron and TIBC; Future; Expected date: 06/14/2023  -     Ferritin; Future; Expected date: 06/14/2023    3. Skin tag   - Cleanse daily    - Skin tag should fall off in the next 7-10 days    4. Primary hypertension   - Stable-Continue Lisinopril/HCTZ   - Continue current plan of care       Follow up if symptoms worsen or fail to improve.

## 2023-06-15 ENCOUNTER — TELEPHONE (OUTPATIENT)
Dept: FAMILY MEDICINE | Facility: CLINIC | Age: 53
End: 2023-06-15
Payer: COMMERCIAL

## 2023-06-15 DIAGNOSIS — Z12.31 SCREENING MAMMOGRAM FOR BREAST CANCER: Primary | ICD-10-CM

## 2023-06-15 NOTE — TELEPHONE ENCOUNTER
Called pt ,pt is due Mammogram.She has new insurance that will go in effect in 3 months Placed order to be scheduled in September thru [facility].Pt last one was done 5-7-2021 at [facility].

## 2023-06-21 ENCOUNTER — HOSPITAL ENCOUNTER (EMERGENCY)
Facility: HOSPITAL | Age: 53
Discharge: HOME | End: 2023-06-21
Attending: EMERGENCY MEDICINE
Payer: COMMERCIAL

## 2023-06-21 VITALS
HEIGHT: 62 IN | DIASTOLIC BLOOD PRESSURE: 87 MMHG | TEMPERATURE: 99 F | SYSTOLIC BLOOD PRESSURE: 178 MMHG | HEART RATE: 76 BPM | OXYGEN SATURATION: 100 % | BODY MASS INDEX: 38.64 KG/M2 | WEIGHT: 210 LBS | RESPIRATION RATE: 16 BRPM

## 2023-06-21 DIAGNOSIS — R20.2 PARESTHESIAS: Primary | ICD-10-CM

## 2023-06-21 DIAGNOSIS — H53.9 VISUAL DISTURBANCE: ICD-10-CM

## 2023-06-21 LAB
ANION GAP SERPL CALC-SCNC: 10 MMOL/L (ref 8–16)
BASOPHILS # BLD AUTO: 0.06 K/UL (ref 0–0.2)
BASOPHILS NFR BLD: 0.7 % (ref 0–1.9)
BUN SERPL-MCNC: 9 MG/DL (ref 6–20)
CALCIUM SERPL-MCNC: 9.5 MG/DL (ref 8.7–10.5)
CHLORIDE SERPL-SCNC: 105 MMOL/L (ref 95–110)
CO2 SERPL-SCNC: 24 MMOL/L (ref 23–29)
CREAT SERPL-MCNC: 0.7 MG/DL (ref 0.5–1.4)
DIFFERENTIAL METHOD: ABNORMAL
EOSINOPHIL # BLD AUTO: 0.5 K/UL (ref 0–0.5)
EOSINOPHIL NFR BLD: 5.7 % (ref 0–8)
ERYTHROCYTE [DISTWIDTH] IN BLOOD BY AUTOMATED COUNT: 18.5 % (ref 11.5–14.5)
EST. GFR  (NO RACE VARIABLE): >60 ML/MIN/1.73 M^2
GLUCOSE SERPL-MCNC: 92 MG/DL (ref 70–110)
HCT VFR BLD AUTO: 38.9 % (ref 37–48.5)
HGB BLD-MCNC: 11.6 G/DL (ref 12–16)
IMM GRANULOCYTES # BLD AUTO: 0.04 K/UL (ref 0–0.04)
IMM GRANULOCYTES NFR BLD AUTO: 0.4 % (ref 0–0.5)
LYMPHOCYTES # BLD AUTO: 2.3 K/UL (ref 1–4.8)
LYMPHOCYTES NFR BLD: 26.1 % (ref 18–48)
MCH RBC QN AUTO: 19.2 PG (ref 27–31)
MCHC RBC AUTO-ENTMCNC: 29.8 G/DL (ref 32–36)
MCV RBC AUTO: 64 FL (ref 82–98)
MONOCYTES # BLD AUTO: 0.6 K/UL (ref 0.3–1)
MONOCYTES NFR BLD: 6.9 % (ref 4–15)
NEUTROPHILS # BLD AUTO: 5.4 K/UL (ref 1.8–7.7)
NEUTROPHILS NFR BLD: 60.2 % (ref 38–73)
NRBC BLD-RTO: 0 /100 WBC
PLATELET # BLD AUTO: 262 K/UL (ref 150–450)
PMV BLD AUTO: ABNORMAL FL (ref 9.2–12.9)
POTASSIUM SERPL-SCNC: 3.9 MMOL/L (ref 3.5–5.1)
RBC # BLD AUTO: 6.05 M/UL (ref 4–5.4)
SODIUM SERPL-SCNC: 139 MMOL/L (ref 136–145)
WBC # BLD AUTO: 8.97 K/UL (ref 3.9–12.7)

## 2023-06-21 PROCEDURE — 93010 EKG 12-LEAD: ICD-10-PCS | Mod: ,,, | Performed by: GENERAL PRACTICE

## 2023-06-21 PROCEDURE — 36415 COLL VENOUS BLD VENIPUNCTURE: CPT | Performed by: EMERGENCY MEDICINE

## 2023-06-21 PROCEDURE — 93010 ELECTROCARDIOGRAM REPORT: CPT | Mod: ,,, | Performed by: GENERAL PRACTICE

## 2023-06-21 PROCEDURE — 99285 EMERGENCY DEPT VISIT HI MDM: CPT | Mod: 25

## 2023-06-21 PROCEDURE — 80048 BASIC METABOLIC PNL TOTAL CA: CPT | Performed by: EMERGENCY MEDICINE

## 2023-06-21 PROCEDURE — 85025 COMPLETE CBC W/AUTO DIFF WBC: CPT | Performed by: EMERGENCY MEDICINE

## 2023-06-21 PROCEDURE — 25000003 PHARM REV CODE 250: Performed by: EMERGENCY MEDICINE

## 2023-06-21 PROCEDURE — 93005 ELECTROCARDIOGRAM TRACING: CPT

## 2023-06-21 RX ORDER — ONDANSETRON 4 MG/1
4 TABLET, ORALLY DISINTEGRATING ORAL
Status: COMPLETED | OUTPATIENT
Start: 2023-06-21 | End: 2023-06-21

## 2023-06-21 RX ORDER — ACETAMINOPHEN 500 MG
1000 TABLET ORAL
Status: COMPLETED | OUTPATIENT
Start: 2023-06-21 | End: 2023-06-21

## 2023-06-21 RX ADMIN — ONDANSETRON 4 MG: 4 TABLET, ORALLY DISINTEGRATING ORAL at 02:06

## 2023-06-21 RX ADMIN — ACETAMINOPHEN 1000 MG: 500 TABLET ORAL at 01:06

## 2023-06-21 NOTE — DISCHARGE INSTRUCTIONS
As we discussed, return to the emergency department for new or worsening symptoms including one-sided numbness or weakness, double vision or loss of vision in either eye, or difficulty talking or walking.

## 2023-06-21 NOTE — ED NOTES
Upon discharge, patient is AAOx4, no cardiac or respiratory complications. Follow up care reviewed with patient and has been instructed to return to the ER if needed. Patient verbalized understanding and ambulated to the lobby without difficulty. YUDELKA MORRIS

## 2023-06-21 NOTE — ED PROVIDER NOTES
Chief complaint:  No chief complaint on file.      HPI:  Alexsander Powers is a 53 y.o. female with hx anemia, bipolar d/o presenting with multiple overlapping, unrelated symptoms.  She describes an episode of head pressure yesterday that is nonexertional unrelated to loss of postural tone or any other symptom lasting only 2 minutes she said is not like headache and only mild in intensity.  Today, around 1 hour prior to arrival, she endorses blurry vision she has difficulty differentiating from brief diplopia also now resolved.  She developed tremulousness in the upper extremities and head and neck region absent loss of postural tone, feeling of presyncope, chest pain, dyspnea, or headache.  She did have some intermittent similar pressure sensation in her head after resolution of these symptoms EN route via EMS.  She developed tingling to the distal upper extremities in the lateral portion of this event that has much improved but with mild residual.  This is symmetric.  She is no other ongoing symptom.  She denies weakness.    ROS: As per HPI and below:  No seizure, vomiting, difficulty walking, abdominal pain, diarrhea, blood in the stools, cough, congestion, chest pain, dyspnea, fever, dysuria.    Review of patient's allergies indicates:   Allergen Reactions    Morphine Blisters    Phenergan [promethazine]        Discharge Medication List as of 6/21/2023  3:25 PM        CONTINUE these medications which have NOT CHANGED    Details   albuterol (VENTOLIN HFA) 90 mcg/actuation inhaler Inhale 2 puffs into the lungs every 6 (six) hours as needed for Wheezing. Rescue, Starting Wed 3/15/2023, Normal      gabapentin (NEURONTIN) 100 MG capsule Take 1 capsule (100 mg total) by mouth every evening., Starting Wed 3/15/2023, Normal      hydrOXYzine pamoate (VISTARIL) 25 MG Cap TAKE 1 CAPSULE BY MOUTH EVERY 8 HOURS AS NEEDED FOR ANXIETY, Normal      lisinopriL-hydrochlorothiazide (PRINZIDE,ZESTORETIC) 10-12.5 mg per tablet Take 1  tablet by mouth once daily., Starting Tue 4/18/2023, Normal      magnesium oxide (MAG-OX) 400 mg (241.3 mg magnesium) tablet Take 1 tablet (400 mg total) by mouth once daily., Starting Thu 3/30/2023, Until Fri 3/29/2024, Normal      metoprolol succinate (TOPROL-XL) 25 MG 24 hr tablet Take 1 tablet (25 mg total) by mouth 2 (two) times daily., Starting Thu 3/30/2023, Until Fri 3/29/2024, Normal      omeprazole (PRILOSEC) 40 MG capsule Take 1 capsule (40 mg total) by mouth once daily., Starting Tue 4/18/2023, Normal      potassium chloride SA (K-DUR,KLOR-CON M) 10 MEQ tablet Take 1 tablet (10 mEq total) by mouth once daily., Starting Thu 3/30/2023, Until Fri 3/29/2024, Normal      prenat.vits,eduar,min-iron-folic (PRENATAL VITAMIN) Tab Use as directed, OTC      traMADoL (ULTRAM) 50 mg tablet Take 1 tablet (50 mg total) by mouth every 6 (six) hours as needed for Pain., Starting Wed 4/12/2023, Print             PMH:  As per HPI and below:  Past Medical History:   Diagnosis Date    Bipolar affective disorder     Elevated troponin 6/14/2016    Encounter for blood transfusion     Scoliosis     Scoliosis      Past Surgical History:   Procedure Laterality Date    ABDOMINAL SURGERY      ANKLE FRACTURE SURGERY      APPENDECTOMY      BACK SURGERY      HYSTERECTOMY         Social History     Socioeconomic History    Marital status:    Tobacco Use    Smoking status: Never    Smokeless tobacco: Never   Substance and Sexual Activity    Alcohol use: Yes     Comment: rarely    Drug use: No    Sexual activity: Yes     Birth control/protection: Surgical       Family History   Problem Relation Age of Onset    Cancer Mother     Heart disease Father     Hypertension Father     Diabetes Father     Colon cancer Neg Hx     Colon polyps Neg Hx     Esophageal cancer Neg Hx     Stomach cancer Neg Hx        Physical Exam:    Vitals:    06/21/23 1539   BP: (!) 178/87   Pulse: 76   Resp: 16   Temp: 98.5 °F (36.9 °C)     GENERAL:  No apparent  distress.  Alert.    HEENT:  Moist mucous membranes.  Normocephalic and atraumatic.  Visual fields full to confrontation in all 4 quadrants with each eye tested individually.  NECK:  No swelling.  Midline trachea.   CARDIOVASCULAR:  Regular rate and rhythm.  2+ radial pulses.    PULMONARY:  Lungs clear to auscultation bilaterally.  No wheezes, rales, or rhonci.    ABDOMEN:  Non-tender and non-distended.    EXTREMITIES:  Warm and well perfused.  Brisk capillary refill.    NEUROLOGICAL:  Normal mental status.  Appropriate and conversant.  Normal gait.  5/5 strength and sensation.  CN III through XII intact.      SKIN:  No rashes or ecchymoses.    BACK:  Atraumatic.  No CVA tenderness to palpation.      Labs Reviewed   CBC W/ AUTO DIFFERENTIAL - Abnormal; Notable for the following components:       Result Value    RBC 6.05 (*)     Hemoglobin 11.6 (*)     MCV 64 (*)     MCH 19.2 (*)     MCHC 29.8 (*)     RDW 18.5 (*)     All other components within normal limits   BASIC METABOLIC PANEL       Discharge Medication List as of 6/21/2023  3:25 PM        CONTINUE these medications which have NOT CHANGED    Details   albuterol (VENTOLIN HFA) 90 mcg/actuation inhaler Inhale 2 puffs into the lungs every 6 (six) hours as needed for Wheezing. Rescue, Starting Wed 3/15/2023, Normal      gabapentin (NEURONTIN) 100 MG capsule Take 1 capsule (100 mg total) by mouth every evening., Starting Wed 3/15/2023, Normal      hydrOXYzine pamoate (VISTARIL) 25 MG Cap TAKE 1 CAPSULE BY MOUTH EVERY 8 HOURS AS NEEDED FOR ANXIETY, Normal      lisinopriL-hydrochlorothiazide (PRINZIDE,ZESTORETIC) 10-12.5 mg per tablet Take 1 tablet by mouth once daily., Starting Tue 4/18/2023, Normal      magnesium oxide (MAG-OX) 400 mg (241.3 mg magnesium) tablet Take 1 tablet (400 mg total) by mouth once daily., Starting Thu 3/30/2023, Until Fri 3/29/2024, Normal      metoprolol succinate (TOPROL-XL) 25 MG 24 hr tablet Take 1 tablet (25 mg total) by mouth 2 (two)  times daily., Starting Thu 3/30/2023, Until Fri 3/29/2024, Normal      omeprazole (PRILOSEC) 40 MG capsule Take 1 capsule (40 mg total) by mouth once daily., Starting Tue 4/18/2023, Normal      potassium chloride SA (K-DUR,KLOR-CON M) 10 MEQ tablet Take 1 tablet (10 mEq total) by mouth once daily., Starting Thu 3/30/2023, Until Fri 3/29/2024, Normal      prenat.vits,eduar,min-iron-folic (PRENATAL VITAMIN) Tab Use as directed, OTC      traMADoL (ULTRAM) 50 mg tablet Take 1 tablet (50 mg total) by mouth every 6 (six) hours as needed for Pain., Starting Wed 4/12/2023, Print             Orders Placed This Encounter   Procedures    MRI Brain Without Contrast    CBC Auto Differential    Basic Metabolic Panel    Visual acuity screening    EKG 12-lead       Imaging Results              MRI Brain Without Contrast (Final result)  Result time 06/21/23 15:14:20      Final result by Indra Bauer MD (06/21/23 15:14:20)                   Impression:      1. There is no acute abnormality.  There is minimal nonspecific white matter change.  There is no hemorrhage, mass or acute infarction.  Considering differences in modality, there is no definite change compared to the prior head CT.      Electronically signed by: Indra Bauer MD  Date:    06/21/2023  Time:    15:14               Narrative:    EXAM:  MRI BRAIN WITHOUT CONTRAST    CLINICAL HISTORY:  Headache, new or worsening (Age >= 50y); .    COMPARISON:  Head CT dated 04/05/2016    FINDINGS:  Intracranial contents:There is no acute or significant abnormality.  Specifically, there is no intracranial hemorrhage.  There is no mass.  There are no regions of restricted diffusion to suggest acute infarction.  Brain volume, ventricular size and position are normal for age.  There is no hydrocephalus or midline shift.  There is minimal nonspecific white matter change.  These findings could reflect sequelae of minimal chronic small vessel disease.  There is no abnormal  extra-axial fluid collection.  The basilar cisterns are open.  Flow voids indicating patency are present in the major vessels at the base of the brain.  Incidentally noted is bilateral fetal origin of the posterior cerebral arteries.  The cerebellar tonsils are normal position.  Sellar structures are normal.    Pituitary volume is decreased.    Extracranial contents, calvarium, soft tissues:Baseline marrow signal is normal.  There are small mucous retention cysts in addition to mild mucosal thickening in both maxillary sinuses.  There is mild mucosal thickening in several ethmoid air cells.  The mastoid air cells are clear.                                  (Rad read)    ED Course as of 06/21/23 1728   Wed Jun 21, 2023   1223 D/w neurology Dr. Horne re: presenting subjective complaints yesterday and today with normal neuro exam here.  Unlikely life-threatening neurological event with MRI reasonable for further exclusion and d/c if negative. [MR]   1248 EKG:  Normal sinus rhythm at a rate of 67.  Normal intervals.  Normal axis.  No significant ST or T wave changes suggesting acute ischemia or infarction.  (Independently interpreted by me) [MR]   1523 Patient is resting comfortably with no ongoing symptoms.  She is counseled regarding hypertension and recheck with PCP.  I doubt hypertensive emergency.  I do not think emergent blood pressure reduction is indicated in the emergency department. [MR]      ED Course User Index  [MR] Roderick Killian MD       MDM:    53 y.o. female with multiple largely resolved complaints in the setting of anxiety and prior panic attacks with brief visual disturbance accompanied by tremulousness and paresthesia.  There is a normal neurological exam.  I have discussed with neurology.  I will plan on noncontrast stat MRI.  I do not think tPA or endovascular therapy or stroke activation indicated as this is much less likely overall an ischemic event.  I will monitor the patient.   EKG ordered with low suspicion for arrhythmia.  Other labs ordered for exclusion of alternative process also with low suspicion such as for worsening anemia or electrolyte issue.    Patient serially re-evaluated the emergency department and is asymptomatic at the time of discharge.  MRI is without acute process.  I do not think there would be additional benefit of longer term observation or other emergent diagnostic testing.  She is appropriate for outpatient follow-up with return precautions.    Diagnoses:    1. Visual change  2. Paresthesias       Roderick Killian MD  06/21/23 7142

## 2023-06-23 ENCOUNTER — OFFICE VISIT (OUTPATIENT)
Dept: FAMILY MEDICINE | Facility: CLINIC | Age: 53
End: 2023-06-23
Payer: COMMERCIAL

## 2023-06-23 ENCOUNTER — LAB VISIT (OUTPATIENT)
Dept: LAB | Facility: CLINIC | Age: 53
End: 2023-06-23
Payer: COMMERCIAL

## 2023-06-23 VITALS
WEIGHT: 209.69 LBS | BODY MASS INDEX: 38.59 KG/M2 | HEIGHT: 62 IN | RESPIRATION RATE: 18 BRPM | OXYGEN SATURATION: 98 % | DIASTOLIC BLOOD PRESSURE: 80 MMHG | SYSTOLIC BLOOD PRESSURE: 138 MMHG | HEART RATE: 88 BPM

## 2023-06-23 DIAGNOSIS — I10 PRIMARY HYPERTENSION: ICD-10-CM

## 2023-06-23 DIAGNOSIS — R45.0 JITTERY FEELING: ICD-10-CM

## 2023-06-23 DIAGNOSIS — R20.2 NUMBNESS AND TINGLING: ICD-10-CM

## 2023-06-23 DIAGNOSIS — E66.01 SEVERE OBESITY (BMI 35.0-39.9) WITH COMORBIDITY: ICD-10-CM

## 2023-06-23 DIAGNOSIS — R20.2 NUMBNESS AND TINGLING: Primary | ICD-10-CM

## 2023-06-23 DIAGNOSIS — R20.0 NUMBNESS AND TINGLING: Primary | ICD-10-CM

## 2023-06-23 DIAGNOSIS — R20.0 NUMBNESS AND TINGLING: ICD-10-CM

## 2023-06-23 LAB
ALBUMIN SERPL BCP-MCNC: 4 G/DL (ref 3.5–5.2)
ALP SERPL-CCNC: 81 U/L (ref 55–135)
ALT SERPL W/O P-5'-P-CCNC: 51 U/L (ref 10–44)
ANION GAP SERPL CALC-SCNC: 8 MMOL/L (ref 8–16)
AST SERPL-CCNC: 27 U/L (ref 10–40)
BILIRUB SERPL-MCNC: 0.4 MG/DL (ref 0.1–1)
BUN SERPL-MCNC: 12 MG/DL (ref 6–20)
CALCIUM SERPL-MCNC: 9.6 MG/DL (ref 8.7–10.5)
CHLORIDE SERPL-SCNC: 103 MMOL/L (ref 95–110)
CO2 SERPL-SCNC: 27 MMOL/L (ref 23–29)
CREAT SERPL-MCNC: 0.7 MG/DL (ref 0.5–1.4)
CRP SERPL-MCNC: 9 MG/L (ref 0–8.2)
ERYTHROCYTE [SEDIMENTATION RATE] IN BLOOD BY WESTERGREN METHOD: 5 MM/HR (ref 0–20)
EST. GFR  (NO RACE VARIABLE): >60 ML/MIN/1.73 M^2
GLUCOSE SERPL-MCNC: 96 MG/DL (ref 70–110)
POTASSIUM SERPL-SCNC: 4 MMOL/L (ref 3.5–5.1)
PROT SERPL-MCNC: 7.5 G/DL (ref 6–8.4)
SODIUM SERPL-SCNC: 138 MMOL/L (ref 136–145)

## 2023-06-23 PROCEDURE — 86140 C-REACTIVE PROTEIN: CPT | Performed by: FAMILY MEDICINE

## 2023-06-23 PROCEDURE — 99214 PR OFFICE/OUTPT VISIT, EST, LEVL IV, 30-39 MIN: ICD-10-PCS | Mod: ,,, | Performed by: FAMILY MEDICINE

## 2023-06-23 PROCEDURE — 86431 RHEUMATOID FACTOR QUANT: CPT | Performed by: FAMILY MEDICINE

## 2023-06-23 PROCEDURE — 86235 NUCLEAR ANTIGEN ANTIBODY: CPT | Performed by: FAMILY MEDICINE

## 2023-06-23 PROCEDURE — 86592 SYPHILIS TEST NON-TREP QUAL: CPT | Performed by: FAMILY MEDICINE

## 2023-06-23 PROCEDURE — 86038 ANTINUCLEAR ANTIBODIES: CPT | Performed by: FAMILY MEDICINE

## 2023-06-23 PROCEDURE — 82607 VITAMIN B-12: CPT | Performed by: FAMILY MEDICINE

## 2023-06-23 PROCEDURE — 82300 ASSAY OF CADMIUM: CPT | Performed by: FAMILY MEDICINE

## 2023-06-23 PROCEDURE — 83921 ORGANIC ACID SINGLE QUANT: CPT | Performed by: FAMILY MEDICINE

## 2023-06-23 PROCEDURE — 80053 COMPREHEN METABOLIC PANEL: CPT | Performed by: FAMILY MEDICINE

## 2023-06-23 PROCEDURE — 99214 OFFICE O/P EST MOD 30 MIN: CPT | Mod: ,,, | Performed by: FAMILY MEDICINE

## 2023-06-23 PROCEDURE — 85651 RBC SED RATE NONAUTOMATED: CPT | Performed by: FAMILY MEDICINE

## 2023-06-23 NOTE — PROGRESS NOTES
Subjective:       Patient ID: Alexsander Powers is a 53 y.o. female.    Chief Complaint: Follow-up (Pt presents to the clinic for a hos f/u. Pt states at times she has blurry vision, tremors, )    This patient is new to me.  Alexsander Powers presents to the clinic today for ER follow up. Patient describes it as numbness and tingling from head to hands. Patient states her hands were itchy due to tingling sensation like pins and needles. Patient states she felt like she had a mild tremor or shaking. Patient states she had this while in the MRI but was told her MRI was fine. Previous imaging reviewed with patient today. 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 Patient states had double vision and then it turned completely blurry. Patient states she has history of anxiety but this was very different.   Patient educated on plan of care, verbalized understanding.       ER note on 6/24/2023  HPI:  Alexsander Powers is a 53 y.o. female with hx anemia, bipolar d/o presenting with multiple overlapping, unrelated symptoms.  She describes an episode of head pressure yesterday that is nonexertional unrelated to loss of postural tone or any other symptom lasting only 2 minutes she said is not like headache and only mild in intensity.  Today, around 1 hour prior to arrival, she endorses blurry vision she has difficulty differentiating from brief diplopia also now resolved.  She developed tremulousness in the upper extremities and head and neck region absent loss of postural tone, feeling of presyncope, chest pain, dyspnea, or headache.  She did have some intermittent similar pressure sensation in her head after resolution of these symptoms EN route via EMS.  She developed tingling to the distal upper extremities in the lateral portion of this event that has much improved but with mild residual.  This is symmetric.  She is no other ongoing symptom.  She  denies weakness.  MDM:    53 y.o. female with multiple largely resolved complaints in the setting of anxiety and prior panic attacks with brief visual disturbance accompanied by tremulousness and paresthesia.  There is a normal neurological exam.  I have discussed with neurology.  I will plan on noncontrast stat MRI.  I do not think tPA or endovascular therapy or stroke activation indicated as this is much less likely overall an ischemic event.  I will monitor the patient.  EKG ordered with low suspicion for arrhythmia.  Other labs ordered for exclusion of alternative process also with low suspicion such as for worsening anemia or electrolyte issue.     Patient serially re-evaluated the emergency department and is asymptomatic at the time of discharge.  MRI is without acute process.  I do not think there would be additional benefit of longer term observation or other emergent diagnostic testing.  She is appropriate for outpatient follow-up with return precautions.     Diagnoses:    1. Visual change  2. Paresthesias     Review of Systems   Constitutional:  Negative for activity change, appetite change, chills, diaphoresis and fever.   HENT:  Negative for congestion, ear pain, postnasal drip, sinus pressure, sneezing and sore throat.    Eyes:  Positive for visual disturbance. Negative for pain, discharge, redness and itching.   Respiratory:  Negative for apnea, cough, chest tightness, shortness of breath and wheezing.    Cardiovascular:  Negative for chest pain and leg swelling.   Gastrointestinal:  Negative for abdominal distention, abdominal pain, constipation, diarrhea, nausea and vomiting.   Genitourinary:  Negative for difficulty urinating, dysuria, flank pain and frequency.   Skin:  Negative for color change, rash and wound.   Neurological:  Positive for dizziness and tremors.     Patient Active Problem List   Diagnosis    Chest pain on breathing    Bipolar affective disorder    Obesity    Microcytic anemia     "Hypokalemia    Hypertriglyceridemia    Systolic murmur    NSAID long-term use    Hypertension    Beta thalassemia minor    Shortness of breath    Abnormal LFTs       Objective:      Physical Exam  Vitals reviewed.   Constitutional:       General: She is not in acute distress.     Appearance: Normal appearance. She is well-developed.   HENT:      Head: Normocephalic.      Nose: Nose normal.   Eyes:      Conjunctiva/sclera: Conjunctivae normal.      Pupils: Pupils are equal, round, and reactive to light.   Cardiovascular:      Rate and Rhythm: Normal rate.      Heart sounds: No murmur heard.  Pulmonary:      Effort: Pulmonary effort is normal. No respiratory distress.   Musculoskeletal:      Cervical back: Normal range of motion and neck supple.   Skin:     General: Skin is warm and dry.      Findings: No rash.   Neurological:      Mental Status: She is alert and oriented to person, place, and time.   Psychiatric:         Mood and Affect: Mood normal.         Behavior: Behavior normal.       Lab Results   Component Value Date    WBC 8.97 06/21/2023    HGB 11.6 (L) 06/21/2023    HCT 38.9 06/21/2023     06/21/2023    CHOL 194 12/02/2022    TRIG 115 12/02/2022    HDL 51 12/02/2022    ALT 40 06/14/2023    AST 26 06/14/2023     06/21/2023    K 3.9 06/21/2023     06/21/2023    CREATININE 0.7 06/21/2023    BUN 9 06/21/2023    CO2 24 06/21/2023    TSH 2.949 03/29/2023    INR 0.9 11/06/2019    HGBA1C 5.5 06/13/2016     The 10-year ASCVD risk score (Graham DAVIES, et al., 2019) is: 2.2%    Values used to calculate the score:      Age: 53 years      Sex: Female      Is Non- : No      Diabetic: No      Tobacco smoker: No      Systolic Blood Pressure: 138 mmHg      Is BP treated: Yes      HDL Cholesterol: 51 mg/dL      Total Cholesterol: 194 mg/dL  Visit Vitals  /80 (BP Location: Right arm, Patient Position: Sitting, BP Method: Large (Manual))   Pulse 88   Resp 18   Ht 5' 1.5" (1.562 m) "   Wt 95.1 kg (209 lb 10.5 oz)   SpO2 98%   BMI 38.97 kg/m²      Assessment:       1. Numbness and tingling    2. Jittery feeling    3. Severe obesity (BMI 35.0-39.9) with comorbidity    4. Primary hypertension        Plan:       Alexsander was seen today for follow-up.    Diagnoses and all orders for this visit:    Numbness and tingling / Jittery feeling  -     MATTHEW; Future  -     Comprehensive metabolic panel; Future  -     Heavy Metals Screen, Blood (Quantitative); Future  -     RPR; Future  -     Rheumatoid factor; Future  -     Sedimentation rate; Future  -     Sjogrens syndrome-A extractable nuclear antibody; Future  -     Vitamin B12 Deficiency Panel; Future  -     C-Reactive Protein; Future  Discussed after blood work will decide if rheumatology or neurology is the next step  Continue medications as prescribed.  Follow up with PCP     Severe obesity (BMI 35.0-39.9) with comorbidity  Stable  Continue medications as prescribed.  Follow up with PCP     Primary hypertension  Stable  Continue medications as prescribed.  Follow up with PCP      Follow up if symptoms worsen or fail to improve, for scheduled appt.

## 2023-06-24 LAB
RHEUMATOID FACT SERPL-ACNC: <13 IU/ML (ref 0–15)
RPR SER QL: NORMAL

## 2023-06-26 LAB
ANA SER QL IF: NORMAL
ANTI-SSA ANTIBODY: 0.07 RATIO (ref 0–0.99)
ANTI-SSA INTERPRETATION: NEGATIVE
ARSENIC BLD-MCNC: <1 NG/ML
CADMIUM BLD-MCNC: 0.2 NG/ML
CITY: NORMAL
COUNTY: NORMAL
GUARDIAN FIRST NAME: NORMAL
GUARDIAN LAST NAME: NORMAL
HOME PHONE: NORMAL
LEAD BLD-MCNC: <1 MCG/DL
MERCURY BLD-MCNC: <1 NG/ML
RACE: NORMAL
STATE: NORMAL
STREET ADDRESS: NORMAL
VENOUS/CAPILLARY: NORMAL
VIT B12 SERPL-MCNC: 279 NG/L (ref 180–914)
ZIP: NORMAL

## 2023-06-29 LAB — METHYLMALONATE SERPL-SCNC: 0.17 NMOL/ML

## 2024-08-10 ENCOUNTER — HOSPITAL ENCOUNTER (EMERGENCY)
Facility: HOSPITAL | Age: 54
Discharge: HOME | End: 2024-08-10
Attending: STUDENT IN AN ORGANIZED HEALTH CARE EDUCATION/TRAINING PROGRAM
Payer: COMMERCIAL

## 2024-08-10 VITALS
SYSTOLIC BLOOD PRESSURE: 160 MMHG | HEIGHT: 60 IN | TEMPERATURE: 98 F | DIASTOLIC BLOOD PRESSURE: 70 MMHG | BODY MASS INDEX: 37.3 KG/M2 | WEIGHT: 190 LBS | RESPIRATION RATE: 17 BRPM | OXYGEN SATURATION: 98 % | HEART RATE: 90 BPM

## 2024-08-10 DIAGNOSIS — R07.9 CHEST PAIN: ICD-10-CM

## 2024-08-10 DIAGNOSIS — R42 DIZZINESS: Primary | ICD-10-CM

## 2024-08-10 DIAGNOSIS — R51.9 ACUTE NONINTRACTABLE HEADACHE, UNSPECIFIED HEADACHE TYPE: ICD-10-CM

## 2024-08-10 LAB
ALBUMIN SERPL BCP-MCNC: 3.9 G/DL (ref 3.5–5.2)
ALP SERPL-CCNC: 74 U/L (ref 55–135)
ALT SERPL W/O P-5'-P-CCNC: 41 U/L (ref 10–44)
ANION GAP SERPL CALC-SCNC: 12 MMOL/L (ref 8–16)
AST SERPL-CCNC: 25 U/L (ref 10–40)
BASOPHILS # BLD AUTO: 0.05 K/UL (ref 0–0.2)
BASOPHILS NFR BLD: 0.4 % (ref 0–1.9)
BILIRUB SERPL-MCNC: 0.5 MG/DL (ref 0.1–1)
BUN SERPL-MCNC: 13 MG/DL (ref 6–20)
CALCIUM SERPL-MCNC: 9.3 MG/DL (ref 8.7–10.5)
CHLORIDE SERPL-SCNC: 107 MMOL/L (ref 95–110)
CO2 SERPL-SCNC: 18 MMOL/L (ref 23–29)
CREAT SERPL-MCNC: 0.9 MG/DL (ref 0.5–1.4)
DIFFERENTIAL METHOD BLD: ABNORMAL
EOSINOPHIL # BLD AUTO: 0.4 K/UL (ref 0–0.5)
EOSINOPHIL NFR BLD: 3.5 % (ref 0–8)
ERYTHROCYTE [DISTWIDTH] IN BLOOD BY AUTOMATED COUNT: 15.6 % (ref 11.5–14.5)
EST. GFR  (NO RACE VARIABLE): >60 ML/MIN/1.73 M^2
GLUCOSE SERPL-MCNC: 135 MG/DL (ref 70–110)
HCT VFR BLD AUTO: 36.3 % (ref 37–48.5)
HGB BLD-MCNC: 11.1 G/DL (ref 12–16)
IMM GRANULOCYTES # BLD AUTO: 0.02 K/UL (ref 0–0.04)
IMM GRANULOCYTES NFR BLD AUTO: 0.2 % (ref 0–0.5)
LYMPHOCYTES # BLD AUTO: 2.2 K/UL (ref 1–4.8)
LYMPHOCYTES NFR BLD: 19.2 % (ref 18–48)
MCH RBC QN AUTO: 18.8 PG (ref 27–31)
MCHC RBC AUTO-ENTMCNC: 30.6 G/DL (ref 32–36)
MCV RBC AUTO: 62 FL (ref 82–98)
MONOCYTES # BLD AUTO: 0.5 K/UL (ref 0.3–1)
MONOCYTES NFR BLD: 4.2 % (ref 4–15)
NEUTROPHILS # BLD AUTO: 8.3 K/UL (ref 1.8–7.7)
NEUTROPHILS NFR BLD: 72.5 % (ref 38–73)
NRBC BLD-RTO: 0 /100 WBC
PLATELET # BLD AUTO: 241 K/UL (ref 150–450)
PMV BLD AUTO: ABNORMAL FL (ref 9.2–12.9)
POTASSIUM SERPL-SCNC: 3.4 MMOL/L (ref 3.5–5.1)
PROT SERPL-MCNC: 7 G/DL (ref 6–8.4)
RBC # BLD AUTO: 5.89 M/UL (ref 4–5.4)
SODIUM SERPL-SCNC: 137 MMOL/L (ref 136–145)
TROPONIN I SERPL DL<=0.01 NG/ML-MCNC: <0.006 NG/ML (ref 0–0.03)
WBC # BLD AUTO: 11.44 K/UL (ref 3.9–12.7)

## 2024-08-10 PROCEDURE — 84484 ASSAY OF TROPONIN QUANT: CPT | Performed by: STUDENT IN AN ORGANIZED HEALTH CARE EDUCATION/TRAINING PROGRAM

## 2024-08-10 PROCEDURE — 36415 COLL VENOUS BLD VENIPUNCTURE: CPT | Performed by: STUDENT IN AN ORGANIZED HEALTH CARE EDUCATION/TRAINING PROGRAM

## 2024-08-10 PROCEDURE — 93005 ELECTROCARDIOGRAM TRACING: CPT

## 2024-08-10 PROCEDURE — 99285 EMERGENCY DEPT VISIT HI MDM: CPT | Mod: 25

## 2024-08-10 PROCEDURE — 85025 COMPLETE CBC W/AUTO DIFF WBC: CPT | Performed by: STUDENT IN AN ORGANIZED HEALTH CARE EDUCATION/TRAINING PROGRAM

## 2024-08-10 PROCEDURE — 94760 N-INVAS EAR/PLS OXIMETRY 1: CPT

## 2024-08-10 PROCEDURE — 25000003 PHARM REV CODE 250: Performed by: STUDENT IN AN ORGANIZED HEALTH CARE EDUCATION/TRAINING PROGRAM

## 2024-08-10 PROCEDURE — 93010 ELECTROCARDIOGRAM REPORT: CPT | Mod: ,,, | Performed by: GENERAL PRACTICE

## 2024-08-10 PROCEDURE — 80053 COMPREHEN METABOLIC PANEL: CPT | Performed by: STUDENT IN AN ORGANIZED HEALTH CARE EDUCATION/TRAINING PROGRAM

## 2024-08-10 RX ORDER — ACETAMINOPHEN 500 MG
1000 TABLET ORAL
Status: COMPLETED | OUTPATIENT
Start: 2024-08-10 | End: 2024-08-10

## 2024-08-10 RX ADMIN — ACETAMINOPHEN 1000 MG: 500 TABLET ORAL at 01:08

## 2024-08-10 NOTE — ED PROVIDER NOTES
Encounter Date: 8/10/2024       History     Chief Complaint   Patient presents with    Dizziness     Episode of dizziness and chest pressure beginning this am      54 y.o. female with bipolar affective disorder presents via EMS for headache and chest pain.  Patient reports a feeling of fullness and tension extending from her bilateral shoulders up into her head.  It has improved slightly on the time of her arrival to the ED.  She denies any sudden severe headaches.  She does report some chest discomfort and pressure.  She reports some associated transient blurred vision but is able to see normally.  She denies any unilateral weakness, numbness, shortness of breath, fever/chills.    The history is provided by the patient and medical records.     Review of patient's allergies indicates:   Allergen Reactions    Morphine Blisters    Phenergan [promethazine]      Past Medical History:   Diagnosis Date    Bipolar affective disorder     Elevated troponin 6/14/2016    Encounter for blood transfusion     Scoliosis     Scoliosis      Past Surgical History:   Procedure Laterality Date    ABDOMINAL SURGERY      ANKLE FRACTURE SURGERY      APPENDECTOMY      BACK SURGERY      HYSTERECTOMY       Family History   Problem Relation Name Age of Onset    Cancer Mother      Heart disease Father      Hypertension Father      Diabetes Father      Colon cancer Neg Hx      Colon polyps Neg Hx      Esophageal cancer Neg Hx      Stomach cancer Neg Hx       Social History     Tobacco Use    Smoking status: Never    Smokeless tobacco: Never   Substance Use Topics    Alcohol use: Yes     Comment: rarely    Drug use: No     Review of Systems   Reason unable to perform ROS: See HPI for relevant ROS.       Physical Exam     Initial Vitals [08/10/24 1307]   BP Pulse Resp Temp SpO2   (!) 175/87 103 18 98.3 °F (36.8 °C) 97 %      MAP       --         Physical Exam    Nursing note and vitals reviewed.  Constitutional:   Alert, normal work of breathing,  no acute distress   HENT:   Mouth/Throat: Oropharynx is clear and moist.   Eyes: Conjunctivae are normal. No scleral icterus.   Cardiovascular:  Normal rate, regular rhythm and intact distal pulses.           Pulmonary/Chest: Breath sounds normal. No stridor. No respiratory distress.   Abdominal: Abdomen is soft. She exhibits no distension. There is no abdominal tenderness.   Musculoskeletal:         General: No edema.     Neurological:   LOC: Alert  Attention Span: Normal   Language: No aphasia  Articulation: No dysarthria  Orientation: Person, Place, Time   Visual Fields: Intact  EOM (CN III, IV, VI): Full/intact  Pupils (CN II, III): PERRL  Facial Sensation (CN V): Normal  Facial Movement (CN VII): Symmetric facial expression    Motor: Arm left  5/5  Leg left  5/5  Arm right  5/5  Leg right 5/5  Sensation: Intact to light touch   Skin: Skin is warm and dry.         ED Course   Procedures  Labs Reviewed   CBC W/ AUTO DIFFERENTIAL - Abnormal       Result Value    WBC 11.44      RBC 5.89 (*)     Hemoglobin 11.1 (*)     Hematocrit 36.3 (*)     MCV 62 (*)     MCH 18.8 (*)     MCHC 30.6 (*)     RDW 15.6 (*)     Platelets 241      MPV SEE COMMENT      Immature Granulocytes 0.2      Gran # (ANC) 8.3 (*)     Immature Grans (Abs) 0.02      Lymph # 2.2      Mono # 0.5      Eos # 0.4      Baso # 0.05      nRBC 0      Gran % 72.5      Lymph % 19.2      Mono % 4.2      Eosinophil % 3.5      Basophil % 0.4      Differential Method Automated     COMPREHENSIVE METABOLIC PANEL - Abnormal    Sodium 137      Potassium 3.4 (*)     Chloride 107      CO2 18 (*)     Glucose 135 (*)     BUN 13      Creatinine 0.9      Calcium 9.3      Total Protein 7.0      Albumin 3.9      Total Bilirubin 0.5      Alkaline Phosphatase 74      AST 25      ALT 41      eGFR >60      Anion Gap 12     TROPONIN I    Troponin I <0.006            Imaging Results              CT Head Without Contrast (Final result)  Result time 08/10/24 14:38:06      Final  result by Liu Bettencourt MD (08/10/24 14:38:06)                   Impression:      Cortical atrophy with periventricular deep white matter change consistent with chronic small vessel ischemic disease.    Mild maxillary sinus disease.      Electronically signed by: Liu Bettencourt  Date:    08/10/2024  Time:    14:38               Narrative:    EXAMINATION:  CT HEAD WITHOUT CONTRAST    CLINICAL HISTORY:  Headache, new or worsening (Age >= 50y);    TECHNIQUE:  Low dose axial images were obtained through the head.  Coronal and sagittal reformations were also performed. Contrast was not administered.    COMPARISON:  MRI 06/21/2023.  CT 04/05/2016.    FINDINGS:  There is no acute hemorrhage or infarction.  There is cortical atrophy.  There are periventricular deep white matter changes consistent with chronic small vessel ischemic disease.    No extra-axial fluid collections.  Ventricles are normal in size, shape and configuration.  The basal cisterns are patent.    Mild dependent mucoperiosteal thickening of the right left maxillary sinus.  The remaining imaged paranasal sinuses and ethmoid air cells are well aerated.    The mastoid air cells and middle ears are normally pneumatized.                                       Medications   acetaminophen tablet 1,000 mg (1,000 mg Oral Given 8/10/24 1344)     Medical Decision Making  54 y.o. female with bipolar affective disorder presents via EMS for headache and chest pain  Differentials include anxiety, panic attack, metabolic derangement, vasovagal episode, less likely TIA/stroke or intracranial mass  Patient presenting with flushing sensation extending from the upper chest up into the head as well as a headache.  No sudden severe headache, no focal neurological deficits.  She also reports some mild dizziness.  She has a visual disturbance which she is having trouble describing fully.  She initially described it as blurring of her vision but on further discussion, she  just states that her vision seems abnormal.  Visual fields intact, extraocular movements intact, conjunctival within normal limits.  Presentation and exam less consistent with TIA/stroke.  Visual acuity slightly diminished.  She did describe some chest pressure which, on further discussion, is described more as a feeling of flushness in the chest extending up into her head.  No shortness of breath, lungs clear, EKG with no evidence of acute ischemia, troponin within normal limits.  She reports she was at a  this morning and has had multiple severe stressors including illness in the family and having to care for a young family member.  Suspect some component of anxiety, possible panic attack.  She did describe a similar episode in the past which was also associated with stressors  Symptoms improved in the ED, CT head with chronic micro vesicular change.  Discussed CT results with patient. Recommended close PCP follow-up, return precautions given    Amount and/or Complexity of Data Reviewed  Labs: ordered.  Radiology: ordered.  ECG/medicine tests:  Decision-making details documented in ED Course.    Risk  OTC drugs.               ED Course as of 24 0856   Sat Aug 10, 2024   1518 EKG 12-lead  Sinus rhythm, regular, narrow complex, rate of 99, no STEMI, no ST deviations, no significant change compared to prior  [OK]      ED Course User Index  [OK] Charles Cobb MD                           Clinical Impression:  Final diagnoses:  [R07.9] Chest pain  [R51.9] Acute nonintractable headache, unspecified headache type  [R42] Dizziness (Primary)          ED Disposition Condition    Discharge Stable          ED Prescriptions    None       Follow-up Information       Follow up With Specialties Details Why Contact Info Additional Information    Your primary care doctor  Schedule an appointment as soon as possible for a visit        [facility]  As needed, Slurred speech, trouble  moving an arm or leg, chest pain, or for any other concerning symptoms [address] [place] [address] [address] [place] [id number] 1st floor             Charles Cobb MD  08/11/24 2187

## 2024-08-15 LAB
OHS QRS DURATION: 90 MS
OHS QTC CALCULATION: 456 MS

## 2025-04-30 NOTE — PATIENT INSTRUCTIONS
BARNEY patient. Went over results. Patient denies any recent episodes of dizziness, lightheadedness, shortness of breath.        Thank you for allowing me to be part of your healthcare team at [facility]. It is a pleasure to care for you today.   Please take all of your medications as instructed and follow all new instructions from your visit today.  If you received labs or medical tests today you should hear information about results or scheduling either by phone or mychart within approximately a week.   If you have any questions or concerns please do not hesitate to call. Have a blessed day and I hope to see you again soon.  CHRISTINE Chacko